# Patient Record
Sex: FEMALE | Race: BLACK OR AFRICAN AMERICAN | Employment: FULL TIME | URBAN - METROPOLITAN AREA
[De-identification: names, ages, dates, MRNs, and addresses within clinical notes are randomized per-mention and may not be internally consistent; named-entity substitution may affect disease eponyms.]

---

## 2020-05-19 PROCEDURE — 96374 THER/PROPH/DIAG INJ IV PUSH: CPT

## 2020-05-19 PROCEDURE — 99281 EMR DPT VST MAYX REQ PHY/QHP: CPT

## 2020-05-20 ENCOUNTER — HOSPITAL ENCOUNTER (EMERGENCY)
Age: 30
Discharge: HOME | End: 2020-05-20
Payer: SELF-PAY

## 2020-05-20 DIAGNOSIS — T88.7XXA NON-DOSE-RELATED ADVERSE REACTION TO MEDICATION, INITIAL ENCOUNTER: Primary | ICD-10-CM

## 2020-05-20 PROCEDURE — 80053 COMPREHEN METABOLIC PANEL: CPT

## 2020-05-20 PROCEDURE — 85025 COMPLETE CBC W/AUTO DIFF WBC: CPT

## 2020-05-20 PROCEDURE — 74011250636 HC RX REV CODE- 250/636: Performed by: EMERGENCY MEDICINE

## 2020-05-20 PROCEDURE — 83690 ASSAY OF LIPASE: CPT

## 2020-05-20 RX ORDER — ONDANSETRON 2 MG/ML
4 INJECTION INTRAMUSCULAR; INTRAVENOUS
Status: COMPLETED | OUTPATIENT
Start: 2020-05-20 | End: 2020-05-20

## 2020-05-20 RX ADMIN — SODIUM CHLORIDE 1000 ML: 900 INJECTION, SOLUTION INTRAVENOUS at 02:46

## 2020-05-20 RX ADMIN — ONDANSETRON HYDROCHLORIDE 4 MG: 2 INJECTION INTRAMUSCULAR; INTRAVENOUS at 02:46

## 2020-05-20 NOTE — ED PROVIDER NOTES
EMERGENCY DEPARTMENT HISTORY AND PHYSICAL EXAM    Date: (Not on file)  Patient Name: Vidya Milton    History of Presenting Illness     No chief complaint on file. History Provided By: Patient    Additional History (Context):   2:13 AM  Vidya Milton is a 27 y.o. female with PMHX of HTN who presents to the emergency department C/O nausea x 5/19/20. Pt has associated sxs of abd pain, vomiting, SOB and chest pain. Pt states that she was seen at [facility] yesterday afternoon and diagnosed with a kidney infection. She was discharged and prescribed Percocet which initiated her sxs of N/V and abd pain. Pt denies having these sxs prior to taking the Percocet. She also denies sxs of diarrhea, fever and chills. Pt states that she just \"wants to get back to work\". She notes allergies to strawberries, oysters and latex. Social History  Pt notes Tobacco use. No ETOH use. No illicit drug use. Family History  Renal related issues  HTN    PCP: Other, MD Jocelyne        Past History     Past Medical History:  No past medical history on file. Past Surgical History:  No past surgical history on file. Family History:  No family history on file. Social History:  Social History     Tobacco Use    Smoking status: Not on file   Substance Use Topics    Alcohol use: Not on file    Drug use: Not on file       Allergies: Allergies not on file      Review of Systems   Review of Systems   Constitutional: Negative for chills and fever. Respiratory: Positive for shortness of breath. Cardiovascular: Positive for chest pain. Gastrointestinal: Positive for abdominal pain, nausea and vomiting. Negative for diarrhea. All other systems reviewed and are negative. Physical Exam   There were no vitals filed for this visit. Physical Exam  Vitals signs and nursing note reviewed. Constitutional:       General: She is not in acute distress. Appearance: She is well-developed. She is not diaphoretic.    HENT: Head: Normocephalic and atraumatic. Right Ear: External ear normal.      Left Ear: External ear normal.      Mouth/Throat:      Pharynx: No oropharyngeal exudate. Eyes:      General: No scleral icterus. Conjunctiva/sclera: Conjunctivae normal.      Pupils: Pupils are equal, round, and reactive to light. Neck:      Musculoskeletal: Normal range of motion and neck supple. Thyroid: No thyromegaly. Vascular: No JVD. Trachea: No tracheal deviation. Cardiovascular:      Rate and Rhythm: Normal rate and regular rhythm. Heart sounds: Normal heart sounds. Pulmonary:      Effort: Pulmonary effort is normal. No respiratory distress. Breath sounds: Normal breath sounds. No stridor. Abdominal:      General: Bowel sounds are normal. There is no distension. Palpations: Abdomen is soft. Tenderness: There is no abdominal tenderness. There is no guarding or rebound. Musculoskeletal: Normal range of motion. General: No tenderness. Lymphadenopathy:      Cervical: No cervical adenopathy. Skin:     General: Skin is warm and dry. Findings: No erythema or rash. Neurological:      Mental Status: She is alert and oriented to person, place, and time. Cranial Nerves: No cranial nerve deficit. Coordination: Coordination normal.      Deep Tendon Reflexes: Reflexes are normal and symmetric. Reflexes normal.   Psychiatric:         Behavior: Behavior normal.         Thought Content: Thought content normal.         Judgment: Judgment normal.           Diagnostic Study Results     Labs -   No results found for this or any previous visit (from the past 12 hour(s)).     Radiologic Studies -   No orders to display     CT Results  (Last 48 hours)    None        CXR Results  (Last 48 hours)    None          Medications given in the ED-  Medications   ondansetron (ZOFRAN) injection 4 mg (4 mg IntraVENous Given 5/20/20 0246)   sodium chloride 0.9 % bolus infusion 1,000 mL (1,000 mL IntraVENous New Bag 5/20/20 0246)         Medical Decision Making   I am the first provider for this patient. I reviewed the vital signs, available nursing notes, past medical history, past surgical history, family history and social history. Vital Signs-Reviewed the patient's vital signs. Pulse Oximetry Analysis - 100% on RA       Records Reviewed: NURSING NOTES AND PREVIOUS MEDICAL RECORDS    Provider Notes (Medical Decision Making):   Side effects are from pain medications. Charts are unavailable for review. Pt looks well and is not sick or toxic. Will check electrolytes, treat sxs and discharge with medications to treat sxs. Procedures:  Procedures    ED Course:   4:56 AM: Initial assessment performed. The patients presenting problems have been discussed, and they are in agreement with the care plan formulated and outlined with them. I have encouraged them to ask questions as they arise throughout their visit. Diagnosis and Disposition       DISCHARGE NOTE:  2:40 AM  Gus Bowen's  results have been reviewed with her. She has been counseled regarding her diagnosis, treatment, and plan. She verbally conveys understanding and agreement of the signs, symptoms, diagnosis, treatment and prognosis and additionally agrees to follow up as discussed. She also agrees with the care-plan and conveys that all of her questions have been answered. I have also provided discharge instructions for her that include: educational information regarding their diagnosis and treatment, and list of reasons why they would want to return to the ED prior to their follow-up appointment, should her condition change. She has been provided with education for proper emergency department utilization. CLINICAL IMPRESSION:    1. Non-dose-related adverse reaction to medication, initial encounter        PLAN:  1. D/C Home  2. There are no discharge medications for this patient.     3.   Follow-up Information None       _______________________________    This note was partially transcribed via voice recognition software. Although efforts have been made to catch any discrepancies, it may contain sound alike words, grammatical errors, or nonsensical words. Attestations: This note is prepared by SANTOS , acting as Scribe for Lenny. Daina De La Garza MD .    [employer]. Daina De La Garza MD :  The scribe's documentation has been prepared under my direction and personally reviewed by me in its entirety. I confirm that the note above accurately reflects all work, treatment, procedures, and medical decision making performed by me.

## 2020-05-21 LAB
ALBUMIN SERPL-MCNC: 3.5 G/DL (ref 3.4–5)
ALBUMIN/GLOB SERPL: 0.8 {RATIO} (ref 0.8–1.7)
ALP SERPL-CCNC: 104 U/L (ref 45–117)
ALT SERPL-CCNC: 30 U/L (ref 13–56)
ANION GAP SERPL CALC-SCNC: 9 MMOL/L (ref 3–18)
AST SERPL-CCNC: 14 U/L (ref 10–38)
BASOPHILS # BLD: 0 K/UL (ref 0–0.1)
BASOPHILS NFR BLD: 0 % (ref 0–2)
BILIRUB SERPL-MCNC: 0.4 MG/DL (ref 0.2–1)
BUN SERPL-MCNC: 10 MG/DL (ref 7–18)
BUN/CREAT SERPL: 10 (ref 12–20)
CALCIUM SERPL-MCNC: 9.3 MG/DL (ref 8.5–10.1)
CHLORIDE SERPL-SCNC: 110 MMOL/L (ref 100–111)
CO2 SERPL-SCNC: 22 MMOL/L (ref 21–32)
CREAT SERPL-MCNC: 1.04 MG/DL (ref 0.6–1.3)
DIFFERENTIAL METHOD BLD: ABNORMAL
EOSINOPHIL # BLD: 0.1 K/UL (ref 0–0.4)
EOSINOPHIL NFR BLD: 1 % (ref 0–5)
ERYTHROCYTE [DISTWIDTH] IN BLOOD BY AUTOMATED COUNT: 13.5 % (ref 11.6–14.5)
GLOBULIN SER CALC-MCNC: 4.5 G/DL (ref 2–4)
GLUCOSE SERPL-MCNC: 104 MG/DL (ref 74–99)
HCT VFR BLD AUTO: 40.1 % (ref 35–45)
HGB BLD-MCNC: 13.3 G/DL (ref 12–16)
LIPASE SERPL-CCNC: 55 U/L (ref 73–393)
LYMPHOCYTES # BLD: 2.4 K/UL (ref 0.9–3.6)
LYMPHOCYTES NFR BLD: 24 % (ref 21–52)
MCH RBC QN AUTO: 26.4 PG (ref 24–34)
MCHC RBC AUTO-ENTMCNC: 33.2 G/DL (ref 31–37)
MCV RBC AUTO: 79.6 FL (ref 74–97)
MONOCYTES # BLD: 1.2 K/UL (ref 0.05–1.2)
MONOCYTES NFR BLD: 12 % (ref 3–10)
NEUTS SEG # BLD: 6.2 K/UL (ref 1.8–8)
NEUTS SEG NFR BLD: 63 % (ref 40–73)
PLATELET # BLD AUTO: 240 K/UL (ref 135–420)
PMV BLD AUTO: 12.2 FL (ref 9.2–11.8)
POTASSIUM SERPL-SCNC: 3.8 MMOL/L (ref 3.5–5.5)
PROT SERPL-MCNC: 8 G/DL (ref 6.4–8.2)
RBC # BLD AUTO: 5.04 M/UL (ref 4.2–5.3)
SODIUM SERPL-SCNC: 141 MMOL/L (ref 136–145)
WBC # BLD AUTO: 9.9 K/UL (ref 4.6–13.2)

## 2020-12-01 ENCOUNTER — HOSPITAL ENCOUNTER (EMERGENCY)
Age: 30
Discharge: HOME | End: 2020-12-01
Attending: EMERGENCY MEDICINE | Admitting: EMERGENCY MEDICINE

## 2020-12-01 VITALS
DIASTOLIC BLOOD PRESSURE: 75 MMHG | OXYGEN SATURATION: 100 % | TEMPERATURE: 97.8 F | RESPIRATION RATE: 15 BRPM | SYSTOLIC BLOOD PRESSURE: 130 MMHG | HEIGHT: 62 IN | HEART RATE: 82 BPM | WEIGHT: 189 LBS | BODY MASS INDEX: 34.78 KG/M2

## 2020-12-01 DIAGNOSIS — R51.9 ACUTE NONINTRACTABLE HEADACHE, UNSPECIFIED HEADACHE TYPE: Primary | ICD-10-CM

## 2020-12-01 PROCEDURE — 96375 TX/PRO/DX INJ NEW DRUG ADDON: CPT

## 2020-12-01 PROCEDURE — 96374 THER/PROPH/DIAG INJ IV PUSH: CPT

## 2020-12-01 PROCEDURE — 74011250637 HC RX REV CODE- 250/637: Performed by: EMERGENCY MEDICINE

## 2020-12-01 PROCEDURE — 99285 EMERGENCY DEPT VISIT HI MDM: CPT

## 2020-12-01 PROCEDURE — 74011250636 HC RX REV CODE- 250/636: Performed by: EMERGENCY MEDICINE

## 2020-12-01 RX ORDER — BUTALBITAL, ASPIRIN, AND CAFFEINE 325; 50; 40 MG/1; MG/1; MG/1
1 CAPSULE ORAL
Qty: 20 CAP | Refills: 0 | Status: SHIPPED | OUTPATIENT
Start: 2020-12-01 | End: 2021-03-01

## 2020-12-01 RX ORDER — ONDANSETRON 2 MG/ML
4 INJECTION INTRAMUSCULAR; INTRAVENOUS
Status: COMPLETED | OUTPATIENT
Start: 2020-12-01 | End: 2020-12-01

## 2020-12-01 RX ORDER — KETOROLAC TROMETHAMINE 30 MG/ML
30 INJECTION, SOLUTION INTRAMUSCULAR; INTRAVENOUS
Status: COMPLETED | OUTPATIENT
Start: 2020-12-01 | End: 2020-12-01

## 2020-12-01 RX ORDER — OLMESARTAN MEDOXOMIL 20 MG/1
25 TABLET ORAL DAILY
COMMUNITY

## 2020-12-01 RX ORDER — ACETAMINOPHEN 325 MG/1
975 TABLET ORAL
Status: COMPLETED | OUTPATIENT
Start: 2020-12-01 | End: 2020-12-01

## 2020-12-01 RX ADMIN — METHYLPREDNISOLONE SODIUM SUCCINATE 125 MG: 125 INJECTION, POWDER, FOR SOLUTION INTRAMUSCULAR; INTRAVENOUS at 05:00

## 2020-12-01 RX ADMIN — KETOROLAC TROMETHAMINE 30 MG: 30 INJECTION, SOLUTION INTRAMUSCULAR at 04:59

## 2020-12-01 RX ADMIN — SODIUM CHLORIDE 1000 ML: 900 INJECTION, SOLUTION INTRAVENOUS at 04:59

## 2020-12-01 RX ADMIN — ONDANSETRON 4 MG: 2 INJECTION INTRAMUSCULAR; INTRAVENOUS at 04:59

## 2020-12-01 RX ADMIN — ACETAMINOPHEN 975 MG: 325 TABLET ORAL at 05:00

## 2020-12-01 NOTE — LETTER
NOTIFICATION RETURN TO WORK / SCHOOL 
 
12/1/2020 5:53 AM 
 
Ms. Russ Allen [address] [id number] To Whom It May Concern: 
 
Russ Allen is currently under the care of THE [facility] EMERGENCY DEPT. She will return to work/school on: 3 December, 2020 If there are questions or concerns please have the patient contact our office.  
 
 
 
Sincerely, 
 
 
 
 
 
Shantelle Winters MD

## 2021-09-13 ENCOUNTER — HOSPITAL ENCOUNTER (EMERGENCY)
Age: 31
Discharge: HOME | End: 2021-09-13
Attending: EMERGENCY MEDICINE
Payer: MEDICAID

## 2021-09-13 VITALS
SYSTOLIC BLOOD PRESSURE: 125 MMHG | HEIGHT: 62 IN | TEMPERATURE: 97.1 F | BODY MASS INDEX: 35.88 KG/M2 | OXYGEN SATURATION: 100 % | DIASTOLIC BLOOD PRESSURE: 84 MMHG | HEART RATE: 92 BPM | WEIGHT: 195 LBS | RESPIRATION RATE: 19 BRPM

## 2021-09-13 DIAGNOSIS — R11.2 NAUSEA AND VOMITING, INTRACTABILITY OF VOMITING NOT SPECIFIED, UNSPECIFIED VOMITING TYPE: Primary | ICD-10-CM

## 2021-09-13 LAB
ALBUMIN SERPL-MCNC: 3.8 G/DL (ref 3.4–5)
ALBUMIN/GLOB SERPL: 0.8 {RATIO} (ref 0.8–1.7)
ALP SERPL-CCNC: 97 U/L (ref 45–117)
ALT SERPL-CCNC: 26 U/L (ref 13–56)
ANION GAP SERPL CALC-SCNC: 7 MMOL/L (ref 3–18)
APPEARANCE UR: ABNORMAL
AST SERPL-CCNC: 16 U/L (ref 10–38)
ATRIAL RATE: 94 BPM
BACTERIA URNS QL MICRO: ABNORMAL /HPF
BASOPHILS # BLD: 0 K/UL (ref 0–0.1)
BASOPHILS NFR BLD: 0 % (ref 0–2)
BILIRUB SERPL-MCNC: 0.3 MG/DL (ref 0.2–1)
BILIRUB UR QL: NEGATIVE
BUN SERPL-MCNC: 10 MG/DL (ref 7–18)
BUN/CREAT SERPL: 18 (ref 12–20)
CALCIUM SERPL-MCNC: 9 MG/DL (ref 8.5–10.1)
CALCULATED P AXIS, ECG09: 25 DEGREES
CALCULATED R AXIS, ECG10: 51 DEGREES
CALCULATED T AXIS, ECG11: 32 DEGREES
CHLORIDE SERPL-SCNC: 108 MMOL/L (ref 100–111)
CO2 SERPL-SCNC: 26 MMOL/L (ref 21–32)
COLOR UR: YELLOW
CREAT SERPL-MCNC: 0.57 MG/DL (ref 0.6–1.3)
DIAGNOSIS, 93000: NORMAL
DIFFERENTIAL METHOD BLD: ABNORMAL
EOSINOPHIL # BLD: 0.1 K/UL (ref 0–0.4)
EOSINOPHIL NFR BLD: 2 % (ref 0–5)
EPITH CASTS URNS QL MICRO: ABNORMAL /LPF (ref 0–5)
ERYTHROCYTE [DISTWIDTH] IN BLOOD BY AUTOMATED COUNT: 14.4 % (ref 11.6–14.5)
GLOBULIN SER CALC-MCNC: 4.5 G/DL (ref 2–4)
GLUCOSE SERPL-MCNC: 91 MG/DL (ref 74–99)
GLUCOSE UR STRIP.AUTO-MCNC: NEGATIVE MG/DL
HCG SERPL QL: NEGATIVE
HCG UR QL: NEGATIVE
HCT VFR BLD AUTO: 38.7 % (ref 35–45)
HGB BLD-MCNC: 12.3 G/DL (ref 12–16)
HGB UR QL STRIP: NEGATIVE
KETONES UR QL STRIP.AUTO: NEGATIVE MG/DL
LEUKOCYTE ESTERASE UR QL STRIP.AUTO: ABNORMAL
LIPASE SERPL-CCNC: 102 U/L (ref 73–393)
LYMPHOCYTES # BLD: 2.1 K/UL (ref 0.9–3.6)
LYMPHOCYTES NFR BLD: 38 % (ref 21–52)
MCH RBC QN AUTO: 25.8 PG (ref 24–34)
MCHC RBC AUTO-ENTMCNC: 31.8 G/DL (ref 31–37)
MCV RBC AUTO: 81.3 FL (ref 78–100)
MONOCYTES # BLD: 0.4 K/UL (ref 0.05–1.2)
MONOCYTES NFR BLD: 6 % (ref 3–10)
MUCOUS THREADS URNS QL MICRO: ABNORMAL /LPF
NEUTS SEG # BLD: 3 K/UL (ref 1.8–8)
NEUTS SEG NFR BLD: 53 % (ref 40–73)
NITRITE UR QL STRIP.AUTO: NEGATIVE
P-R INTERVAL, ECG05: 158 MS
PH UR STRIP: 7 [PH] (ref 5–8)
PLATELET # BLD AUTO: 242 K/UL (ref 135–420)
PMV BLD AUTO: 13.2 FL (ref 9.2–11.8)
POTASSIUM SERPL-SCNC: 4.4 MMOL/L (ref 3.5–5.5)
PROT SERPL-MCNC: 8.3 G/DL (ref 6.4–8.2)
PROT UR STRIP-MCNC: NEGATIVE MG/DL
Q-T INTERVAL, ECG07: 338 MS
QRS DURATION, ECG06: 86 MS
QTC CALCULATION (BEZET), ECG08: 422 MS
RBC # BLD AUTO: 4.76 M/UL (ref 4.2–5.3)
RBC #/AREA URNS HPF: ABNORMAL /HPF (ref 0–5)
SODIUM SERPL-SCNC: 141 MMOL/L (ref 136–145)
SP GR UR REFRACTOMETRY: 1.02 (ref 1–1.03)
UROBILINOGEN UR QL STRIP.AUTO: 1 EU/DL (ref 0.2–1)
VENTRICULAR RATE, ECG03: 94 BPM
WBC # BLD AUTO: 5.6 K/UL (ref 4.6–13.2)
WBC URNS QL MICRO: ABNORMAL /HPF (ref 0–5)

## 2021-09-13 PROCEDURE — 85025 COMPLETE CBC W/AUTO DIFF WBC: CPT

## 2021-09-13 PROCEDURE — 84703 CHORIONIC GONADOTROPIN ASSAY: CPT

## 2021-09-13 PROCEDURE — 96374 THER/PROPH/DIAG INJ IV PUSH: CPT

## 2021-09-13 PROCEDURE — 99284 EMERGENCY DEPT VISIT MOD MDM: CPT

## 2021-09-13 PROCEDURE — 81001 URINALYSIS AUTO W/SCOPE: CPT

## 2021-09-13 PROCEDURE — 93005 ELECTROCARDIOGRAM TRACING: CPT

## 2021-09-13 PROCEDURE — 81025 URINE PREGNANCY TEST: CPT

## 2021-09-13 PROCEDURE — 74011250636 HC RX REV CODE- 250/636: Performed by: PHYSICIAN ASSISTANT

## 2021-09-13 PROCEDURE — 96375 TX/PRO/DX INJ NEW DRUG ADDON: CPT

## 2021-09-13 PROCEDURE — 80053 COMPREHEN METABOLIC PANEL: CPT

## 2021-09-13 PROCEDURE — 83690 ASSAY OF LIPASE: CPT

## 2021-09-13 PROCEDURE — 96361 HYDRATE IV INFUSION ADD-ON: CPT

## 2021-09-13 RX ORDER — KETOROLAC TROMETHAMINE 30 MG/ML
30 INJECTION, SOLUTION INTRAMUSCULAR; INTRAVENOUS
Status: COMPLETED | OUTPATIENT
Start: 2021-09-13 | End: 2021-09-13

## 2021-09-13 RX ORDER — ONDANSETRON 4 MG/1
4 TABLET, ORALLY DISINTEGRATING ORAL
Qty: 12 TABLET | Refills: 0 | Status: SHIPPED | OUTPATIENT
Start: 2021-09-13

## 2021-09-13 RX ORDER — ONDANSETRON 2 MG/ML
4 INJECTION INTRAMUSCULAR; INTRAVENOUS
Status: COMPLETED | OUTPATIENT
Start: 2021-09-13 | End: 2021-09-13

## 2021-09-13 RX ORDER — FAMOTIDINE 10 MG/ML
20 INJECTION INTRAVENOUS
Status: COMPLETED | OUTPATIENT
Start: 2021-09-13 | End: 2021-09-13

## 2021-09-13 RX ADMIN — SODIUM CHLORIDE, SODIUM LACTATE, POTASSIUM CHLORIDE, AND CALCIUM CHLORIDE 1000 ML: 600; 310; 30; 20 INJECTION, SOLUTION INTRAVENOUS at 09:34

## 2021-09-13 RX ADMIN — FAMOTIDINE 20 MG: 10 INJECTION, SOLUTION INTRAVENOUS at 09:34

## 2021-09-13 RX ADMIN — KETOROLAC TROMETHAMINE 30 MG: 30 INJECTION, SOLUTION INTRAMUSCULAR at 11:36

## 2021-09-13 RX ADMIN — ONDANSETRON 4 MG: 2 INJECTION INTRAMUSCULAR; INTRAVENOUS at 09:34

## 2021-09-13 NOTE — ED PROVIDER NOTES
EMERGENCY DEPARTMENT HISTORY AND PHYSICAL EXAM    Date: 9/13/2021  Patient Name: Noman Tapia    History of Presenting Illness     Chief Complaint   Patient presents with    Vomiting    Syncope         History Provided By: Patient    Chief Complaint: vomiting    HPI(Context):   9:07 AM  Noman Tapia is a 32 y.o. female with PMHX of HTN who presents to the emergency department C/O vomiting. Associated sxs include nausea and near syncope. Sxs x this AM. Pt notes she awoke with nausea and low appetite. Pt vomited several times while at work. Sxs worse with PO intake. No sick contacts or known COVID exposures. Pt denies fever, cough, abdominal pain, and any other sxs or complaints. PCP: Jose, MD Jocelyne    Current Outpatient Medications   Medication Sig Dispense Refill    ondansetron (Zofran ODT) 4 mg disintegrating tablet Take 1 Tablet by mouth every eight (8) hours as needed for Nausea (or vomiting.). Allow to dissolve on tongue 12 Tablet 0    olmesartan (Benicar) 20 mg tablet Take 25 mg by mouth daily. Past History     Past Medical History:  Past Medical History:   Diagnosis Date    Hypertension        Past Surgical History:  History reviewed. No pertinent surgical history. Family History:  History reviewed. No pertinent family history. Social History:  Social History     Tobacco Use    Smoking status: Current Every Day Smoker     Packs/day: 0.25   Substance Use Topics    Alcohol use: Yes     Comment: social    Drug use: Never       Allergies:  No Known Allergies      Review of Systems   Review of Systems   Constitutional: Positive for appetite change. Negative for chills and fever. HENT: Negative for congestion. Respiratory: Negative for cough. Gastrointestinal: Positive for nausea and vomiting. Negative for abdominal pain and diarrhea. Musculoskeletal: Negative for myalgias. Allergic/Immunologic: Negative for immunocompromised state.    All other systems reviewed and are negative. Physical Exam     Vitals:    09/13/21 0901 09/13/21 1015 09/13/21 1045   BP: 137/85 130/69 125/84   Pulse: 95 93 92   Resp: 14 17 19   Temp: 97.1 °F (36.2 °C)     SpO2: 100% 100% 100%   Weight: 88.5 kg (195 lb)     Height: 5' 2\" (1.575 m)       Physical Exam  Vitals and nursing note reviewed. Constitutional:       General: She is not in acute distress. Appearance: She is well-developed. She is not diaphoretic. Comments: AA female in NAD. Alert. Appears comfortable. HENT:      Head: Normocephalic and atraumatic. Right Ear: External ear normal.      Left Ear: External ear normal.      Nose: Nose normal.   Eyes:      General: No scleral icterus. Right eye: No discharge. Left eye: No discharge. Conjunctiva/sclera: Conjunctivae normal.   Cardiovascular:      Rate and Rhythm: Normal rate and regular rhythm. Heart sounds: Normal heart sounds. No murmur heard. No friction rub. No gallop. Pulmonary:      Effort: Pulmonary effort is normal. No tachypnea, accessory muscle usage or respiratory distress. Breath sounds: Normal breath sounds. No decreased breath sounds, wheezing, rhonchi or rales. Abdominal:      Palpations: Abdomen is soft. Tenderness: There is no right CVA tenderness, left CVA tenderness, guarding or rebound. Negative signs include Collazo's sign. Musculoskeletal:         General: Normal range of motion. Cervical back: Normal range of motion. Skin:     General: Skin is warm and dry. Neurological:      Mental Status: She is alert and oriented to person, place, and time. Mental status is at baseline.    Psychiatric:         Behavior: Behavior normal.         Judgment: Judgment normal.             Diagnostic Study Results     Labs -     Recent Results (from the past 12 hour(s))   URINALYSIS W/ RFLX MICROSCOPIC    Collection Time: 09/13/21  9:10 AM   Result Value Ref Range    Color YELLOW      Appearance CLOUDY      Specific gravity 1.019 1.005 - 1.030      pH (UA) 7.0 5.0 - 8.0      Protein Negative NEG mg/dL    Glucose Negative NEG mg/dL    Ketone Negative NEG mg/dL    Bilirubin Negative NEG      Blood Negative NEG      Urobilinogen 1.0 0.2 - 1.0 EU/dL    Nitrites Negative NEG      Leukocyte Esterase SMALL (A) NEG     URINE MICROSCOPIC ONLY    Collection Time: 09/13/21  9:10 AM   Result Value Ref Range    WBC 4 to 10 0 - 5 /hpf    RBC 0 to 3 0 - 5 /hpf    Epithelial cells 3+ 0 - 5 /lpf    Bacteria 3+ (A) NEG /hpf    Mucus 2+ (A) NEG /lpf   EKG, 12 LEAD, INITIAL    Collection Time: 09/13/21  9:10 AM   Result Value Ref Range    Ventricular Rate 94 BPM    Atrial Rate 94 BPM    P-R Interval 158 ms    QRS Duration 86 ms    Q-T Interval 338 ms    QTC Calculation (Bezet) 422 ms    Calculated P Axis 25 degrees    Calculated R Axis 51 degrees    Calculated T Axis 32 degrees    Diagnosis       Normal sinus rhythm  Normal ECG  No previous ECGs available     CBC WITH AUTOMATED DIFF    Collection Time: 09/13/21  9:14 AM   Result Value Ref Range    WBC 5.6 4.6 - 13.2 K/uL    RBC 4.76 4.20 - 5.30 M/uL    HGB 12.3 12.0 - 16.0 g/dL    HCT 38.7 35.0 - 45.0 %    MCV 81.3 78.0 - 100.0 FL    MCH 25.8 24.0 - 34.0 PG    MCHC 31.8 31.0 - 37.0 g/dL    RDW 14.4 11.6 - 14.5 %    PLATELET 922 440 - 010 K/uL    MPV 13.2 (H) 9.2 - 11.8 FL    NEUTROPHILS 53 40 - 73 %    LYMPHOCYTES 38 21 - 52 %    MONOCYTES 6 3 - 10 %    EOSINOPHILS 2 0 - 5 %    BASOPHILS 0 0 - 2 %    ABS. NEUTROPHILS 3.0 1.8 - 8.0 K/UL    ABS. LYMPHOCYTES 2.1 0.9 - 3.6 K/UL    ABS. MONOCYTES 0.4 0.05 - 1.2 K/UL    ABS. EOSINOPHILS 0.1 0.0 - 0.4 K/UL    ABS.  BASOPHILS 0.0 0.0 - 0.1 K/UL    DF AUTOMATED     METABOLIC PANEL, COMPREHENSIVE    Collection Time: 09/13/21  9:14 AM   Result Value Ref Range    Sodium 141 136 - 145 mmol/L    Potassium 4.4 3.5 - 5.5 mmol/L    Chloride 108 100 - 111 mmol/L    CO2 26 21 - 32 mmol/L    Anion gap 7 3.0 - 18 mmol/L    Glucose 91 74 - 99 mg/dL    BUN 10 7.0 - 18 MG/DL Creatinine 0.57 (L) 0.6 - 1.3 MG/DL    BUN/Creatinine ratio 18 12 - 20      GFR est AA >60 >60 ml/min/1.73m2    GFR est non-AA >60 >60 ml/min/1.73m2    Calcium 9.0 8.5 - 10.1 MG/DL    Bilirubin, total 0.3 0.2 - 1.0 MG/DL    ALT (SGPT) 26 13 - 56 U/L    AST (SGOT) 16 10 - 38 U/L    Alk. phosphatase 97 45 - 117 U/L    Protein, total 8.3 (H) 6.4 - 8.2 g/dL    Albumin 3.8 3.4 - 5.0 g/dL    Globulin 4.5 (H) 2.0 - 4.0 g/dL    A-G Ratio 0.8 0.8 - 1.7     LIPASE    Collection Time: 09/13/21  9:14 AM   Result Value Ref Range    Lipase 102 73 - 393 U/L   HCG QL SERUM    Collection Time: 09/13/21  9:14 AM   Result Value Ref Range    HCG, Ql. Negative NEG     HCG URINE, QL. - POC    Collection Time: 09/13/21  9:20 AM   Result Value Ref Range    Pregnancy test,urine (POC) Negative NEG           No orders to display     CT Results  (Last 48 hours)    None        CXR Results  (Last 48 hours)    None          Medications given in the ED-  Medications   lactated ringers bolus infusion 1,000 mL (0 mL IntraVENous IV Completed 9/13/21 1127)   ondansetron (ZOFRAN) injection 4 mg (4 mg IntraVENous Given 9/13/21 0934)   famotidine (PF) (PEPCID) injection 20 mg (20 mg IntraVENous Given 9/13/21 0934)   ketorolac (TORADOL) injection 30 mg (30 mg IntraVENous Given 9/13/21 1136)         Medical Decision Making   I am the first provider for this patient. I reviewed the vital signs, available nursing notes, past medical history, past surgical history, family history and social history. Vital Signs-Reviewed the patient's vital signs. Pulse Oximetry Analysis - 100% on RA. NORMAL     Records Reviewed: Nursing Notes    Provider Notes (Medical Decision Making): viral, food borne, dehydration, metabolic derangement    Procedures:  Procedures    ED Course:   9:07 AM Initial assessment performed. The patients presenting problems have been discussed, and they are in agreement with the care plan formulated and outlined with them.   I have encouraged them to ask questions as they arise throughout their visit. Diagnosis and Disposition       10:05 AM  Offered COVID swab but refuses as she had negative COVID test 2 weeks ago and recently came back to work from [address]. Afebrile. Benign abdomen. No pain at [employer]. Neg Collazo's. Suspect viral v food borne process. [place] diet. Antiemetics. FU with PCP. Reasons to RTED discussed with pt. All questions answered. Pt feels comfortable going home at this time. Pt expressed understanding and she agrees with plan. 1. Nausea and vomiting, intractability of vomiting not specified, unspecified vomiting type        PLAN:  1. D/C Home  2. Discharge Medication List as of 9/13/2021 11:36 AM      START taking these medications    Details   ondansetron (Zofran ODT) 4 mg disintegrating tablet Take 1 Tablet by mouth every eight (8) hours as needed for Nausea (or vomiting.). Allow to dissolve on tongue, Normal, Disp-12 Tablet, R-0         CONTINUE these medications which have NOT CHANGED    Details   olmesartan (Benicar) 20 mg tablet Take 25 mg by mouth daily. , Historical Med           3. Follow-up Information     Follow up With Specialties Details Why Contact Info    your PCP        [facility] EMERGENCY DEPT Emergency Medicine   [address] [phone]        _______________________________    Attestations: This note is prepared by Angie Rivera PA-C.  _______________________________          Please note that this dictation was completed with [employer], the [employer] voice recognition software. Quite often unanticipated grammatical, syntax, homophones, and other interpretive errors are inadvertently transcribed by the computer software. Please disregard these errors. Please excuse any errors that have escaped final proofreading.

## 2021-09-13 NOTE — LETTER
[facility]  [facility]  [address] [address] [phone]    Work/School Note    Date: 9/13/2021    To Whom It May concern:    Karen Headley was seen and treated today in the emergency room by the following provider(s):  Attending Provider: Winnifred Sacks, MD  Physician Assistant: Niall Gutierrez PA-C. Karen Headley may return to work on 09/15/2021.     Sincerely,          Vilma Blanchard PA-C

## 2022-12-13 ENCOUNTER — HOSPITAL ENCOUNTER (EMERGENCY)
Age: 32
Discharge: HOME | End: 2022-12-13
Attending: EMERGENCY MEDICINE
Payer: COMMERCIAL

## 2022-12-13 ENCOUNTER — APPOINTMENT (OUTPATIENT)
Dept: CT IMAGING | Age: 32
End: 2022-12-13
Attending: EMERGENCY MEDICINE
Payer: COMMERCIAL

## 2022-12-13 VITALS
SYSTOLIC BLOOD PRESSURE: 133 MMHG | HEIGHT: 64 IN | OXYGEN SATURATION: 100 % | WEIGHT: 200 LBS | DIASTOLIC BLOOD PRESSURE: 70 MMHG | HEART RATE: 77 BPM | BODY MASS INDEX: 34.15 KG/M2 | RESPIRATION RATE: 18 BRPM | TEMPERATURE: 97.5 F

## 2022-12-13 DIAGNOSIS — N13.2 URETERAL STONE WITH HYDRONEPHROSIS: Primary | ICD-10-CM

## 2022-12-13 DIAGNOSIS — N23 RENAL COLIC ON LEFT SIDE: ICD-10-CM

## 2022-12-13 LAB
ALBUMIN SERPL-MCNC: 3.7 G/DL (ref 3.4–5)
ALBUMIN/GLOB SERPL: 0.9 {RATIO} (ref 0.8–1.7)
ALP SERPL-CCNC: 77 U/L (ref 45–117)
ALT SERPL-CCNC: 16 U/L (ref 13–56)
AMORPH CRY URNS QL MICRO: ABNORMAL
ANION GAP SERPL CALC-SCNC: 2 MMOL/L (ref 3–18)
APPEARANCE UR: ABNORMAL
AST SERPL-CCNC: 10 U/L (ref 10–38)
BACTERIA URNS QL MICRO: ABNORMAL /HPF
BASOPHILS # BLD: 0 K/UL (ref 0–0.1)
BASOPHILS NFR BLD: 1 % (ref 0–2)
BILIRUB SERPL-MCNC: 0.2 MG/DL (ref 0.2–1)
BILIRUB UR QL: NEGATIVE
BUN SERPL-MCNC: 10 MG/DL (ref 7–18)
BUN/CREAT SERPL: 11 (ref 12–20)
CALCIUM SERPL-MCNC: 9 MG/DL (ref 8.5–10.1)
CHLORIDE SERPL-SCNC: 108 MMOL/L (ref 100–111)
CO2 SERPL-SCNC: 29 MMOL/L (ref 21–32)
COLOR UR: ABNORMAL
CREAT SERPL-MCNC: 0.87 MG/DL (ref 0.6–1.3)
DIFFERENTIAL METHOD BLD: ABNORMAL
EOSINOPHIL # BLD: 0.1 K/UL (ref 0–0.4)
EOSINOPHIL NFR BLD: 1 % (ref 0–5)
EPITH CASTS URNS QL MICRO: ABNORMAL /LPF (ref 0–5)
ERYTHROCYTE [DISTWIDTH] IN BLOOD BY AUTOMATED COUNT: 14.8 % (ref 11.6–14.5)
GLOBULIN SER CALC-MCNC: 4 G/DL (ref 2–4)
GLUCOSE SERPL-MCNC: 111 MG/DL (ref 74–99)
GLUCOSE UR STRIP.AUTO-MCNC: NEGATIVE MG/DL
HCG UR QL: NEGATIVE
HCT VFR BLD AUTO: 37.2 % (ref 35–45)
HGB BLD-MCNC: 11.4 G/DL (ref 12–16)
HGB UR QL STRIP: ABNORMAL
IMM GRANULOCYTES # BLD AUTO: 0 K/UL (ref 0–0.04)
IMM GRANULOCYTES NFR BLD AUTO: 0 % (ref 0–0.5)
KETONES UR QL STRIP.AUTO: NEGATIVE MG/DL
LEUKOCYTE ESTERASE UR QL STRIP.AUTO: NEGATIVE
LIPASE SERPL-CCNC: 132 U/L (ref 73–393)
LYMPHOCYTES # BLD: 2.7 K/UL (ref 0.9–3.6)
LYMPHOCYTES NFR BLD: 47 % (ref 21–52)
MCH RBC QN AUTO: 26.1 PG (ref 24–34)
MCHC RBC AUTO-ENTMCNC: 30.6 G/DL (ref 31–37)
MCV RBC AUTO: 85.3 FL (ref 78–100)
MONOCYTES # BLD: 0.4 K/UL (ref 0.05–1.2)
MONOCYTES NFR BLD: 8 % (ref 3–10)
NEUTS SEG # BLD: 2.4 K/UL (ref 1.8–8)
NEUTS SEG NFR BLD: 43 % (ref 40–73)
NITRITE UR QL STRIP.AUTO: NEGATIVE
NRBC # BLD: 0 K/UL (ref 0–0.01)
NRBC BLD-RTO: 0 PER 100 WBC
PH UR STRIP: 8.5 [PH] (ref 5–8)
PLATELET # BLD AUTO: 235 K/UL (ref 135–420)
PMV BLD AUTO: 12.7 FL (ref 9.2–11.8)
POTASSIUM SERPL-SCNC: 3.9 MMOL/L (ref 3.5–5.5)
PROT SERPL-MCNC: 7.7 G/DL (ref 6.4–8.2)
PROT UR STRIP-MCNC: 100 MG/DL
RBC # BLD AUTO: 4.36 M/UL (ref 4.2–5.3)
RBC #/AREA URNS HPF: ABNORMAL /HPF (ref 0–5)
SODIUM SERPL-SCNC: 139 MMOL/L (ref 136–145)
SP GR UR REFRACTOMETRY: 1.02 (ref 1–1.03)
UROBILINOGEN UR QL STRIP.AUTO: 0.2 EU/DL (ref 0.2–1)
WBC # BLD AUTO: 5.6 K/UL (ref 4.6–13.2)
WBC URNS QL MICRO: ABNORMAL /HPF (ref 0–5)

## 2022-12-13 PROCEDURE — 85025 COMPLETE CBC W/AUTO DIFF WBC: CPT

## 2022-12-13 PROCEDURE — 74011250637 HC RX REV CODE- 250/637: Performed by: EMERGENCY MEDICINE

## 2022-12-13 PROCEDURE — 96375 TX/PRO/DX INJ NEW DRUG ADDON: CPT

## 2022-12-13 PROCEDURE — 83690 ASSAY OF LIPASE: CPT

## 2022-12-13 PROCEDURE — 99284 EMERGENCY DEPT VISIT MOD MDM: CPT

## 2022-12-13 PROCEDURE — 74176 CT ABD & PELVIS W/O CONTRAST: CPT

## 2022-12-13 PROCEDURE — 81001 URINALYSIS AUTO W/SCOPE: CPT

## 2022-12-13 PROCEDURE — 96376 TX/PRO/DX INJ SAME DRUG ADON: CPT

## 2022-12-13 PROCEDURE — 96374 THER/PROPH/DIAG INJ IV PUSH: CPT

## 2022-12-13 PROCEDURE — 81025 URINE PREGNANCY TEST: CPT

## 2022-12-13 PROCEDURE — 74011250636 HC RX REV CODE- 250/636: Performed by: EMERGENCY MEDICINE

## 2022-12-13 PROCEDURE — 80053 COMPREHEN METABOLIC PANEL: CPT

## 2022-12-13 RX ORDER — OXYCODONE HYDROCHLORIDE 5 MG/1
5 TABLET ORAL
Qty: 9 TABLET | Refills: 0 | Status: SHIPPED | OUTPATIENT
Start: 2022-12-13 | End: 2022-12-16

## 2022-12-13 RX ORDER — KETOROLAC TROMETHAMINE 10 MG/1
10 TABLET, FILM COATED ORAL
Qty: 20 TABLET | Refills: 0 | Status: SHIPPED | OUTPATIENT
Start: 2022-12-13

## 2022-12-13 RX ORDER — MORPHINE SULFATE 4 MG/ML
4 INJECTION INTRAVENOUS
Status: COMPLETED | OUTPATIENT
Start: 2022-12-13 | End: 2022-12-13

## 2022-12-13 RX ORDER — ONDANSETRON 2 MG/ML
4 INJECTION INTRAMUSCULAR; INTRAVENOUS
Status: COMPLETED | OUTPATIENT
Start: 2022-12-13 | End: 2022-12-13

## 2022-12-13 RX ORDER — KETOROLAC TROMETHAMINE 30 MG/ML
15 INJECTION, SOLUTION INTRAMUSCULAR; INTRAVENOUS
Status: COMPLETED | OUTPATIENT
Start: 2022-12-13 | End: 2022-12-13

## 2022-12-13 RX ORDER — ONDANSETRON 4 MG/1
4 TABLET, ORALLY DISINTEGRATING ORAL
Qty: 20 TABLET | Refills: 0 | Status: SHIPPED | OUTPATIENT
Start: 2022-12-13

## 2022-12-13 RX ORDER — TAMSULOSIN HYDROCHLORIDE 0.4 MG/1
0.4 CAPSULE ORAL
Status: COMPLETED | OUTPATIENT
Start: 2022-12-13 | End: 2022-12-13

## 2022-12-13 RX ORDER — MORPHINE SULFATE 4 MG/ML
4 INJECTION INTRAVENOUS
Status: DISCONTINUED | OUTPATIENT
Start: 2022-12-13 | End: 2022-12-13 | Stop reason: HOSPADM

## 2022-12-13 RX ORDER — TAMSULOSIN HYDROCHLORIDE 0.4 MG/1
0.4 CAPSULE ORAL DAILY
Qty: 5 CAPSULE | Refills: 0 | Status: SHIPPED | OUTPATIENT
Start: 2022-12-13 | End: 2022-12-18

## 2022-12-13 RX ADMIN — MORPHINE SULFATE 4 MG: 4 INJECTION INTRAVENOUS at 10:43

## 2022-12-13 RX ADMIN — KETOROLAC TROMETHAMINE 15 MG: 30 INJECTION, SOLUTION INTRAMUSCULAR; INTRAVENOUS at 12:49

## 2022-12-13 RX ADMIN — KETOROLAC TROMETHAMINE 15 MG: 30 INJECTION, SOLUTION INTRAMUSCULAR; INTRAVENOUS at 11:38

## 2022-12-13 RX ADMIN — TAMSULOSIN HYDROCHLORIDE 0.4 MG: 0.4 CAPSULE ORAL at 12:49

## 2022-12-13 RX ADMIN — SODIUM CHLORIDE 1000 ML: 9 INJECTION, SOLUTION INTRAVENOUS at 10:44

## 2022-12-13 RX ADMIN — ONDANSETRON 4 MG: 2 INJECTION INTRAMUSCULAR; INTRAVENOUS at 12:58

## 2022-12-13 RX ADMIN — ONDANSETRON 4 MG: 2 INJECTION INTRAMUSCULAR; INTRAVENOUS at 10:39

## 2022-12-13 NOTE — ED PROVIDER NOTES
EMERGENCY DEPARTMENT HISTORY AND PHYSICAL EXAM      Date: 12/13/2022  Patient Name: Valerie Keita    History of Presenting Illness     Chief Complaint   Patient presents with    Flank Pain       History Provided By: Patient    HPI: Valerie Keita, 28 y.o. female with history of ectopic pregnancy 1 year ago presents to the ED with cc of left flank pain, nausea, vomiting, vaginal spotting. Symptoms onset about 1 hour prior to arrival while patient was working across the [place]. She developed sudden onset left flank pain which is described as severe, constant, nonradiating. Symptoms associated with nonbloody nonbilious emesis as well as vaginal spotting. LMP 1 week ago and was normal for her. Denies urinary symptoms, fevers, chills, chest pain, shortness of breath. Symptoms unfamiliar to her. No prior history of kidney stones. There are no other complaints, changes, or physical findings at this time. PCP: Other, MD Jocelyne    No current facility-administered medications on file prior to encounter. Current Outpatient Medications on File Prior to Encounter   Medication Sig Dispense Refill    olmesartan (BENICAR) 20 mg tablet Take 25 mg by mouth daily. [DISCONTINUED] ondansetron (Zofran ODT) 4 mg disintegrating tablet Take 1 Tablet by mouth every eight (8) hours as needed for Nausea (or vomiting.). Allow to dissolve on tongue 12 Tablet 0       Past History     Past Medical History:  Past Medical History:   Diagnosis Date    Ectopic pregnancy     Hypertension        Past Surgical History:  History reviewed. No pertinent surgical history. Family History:  History reviewed. No pertinent family history.     Social History:  Social History     Tobacco Use    Smoking status: Every Day     Packs/day: 0.25     Types: Cigarettes   Substance Use Topics    Alcohol use: Yes     Comment: social    Drug use: Never       Allergies:  No Known Allergies      Review of Systems   Review of Systems Constitutional:  Negative for chills and fever. Respiratory:  Negative for cough and shortness of breath. Cardiovascular:  Negative for chest pain and leg swelling. Gastrointestinal:  Positive for nausea and vomiting. Negative for abdominal pain. Genitourinary:  Positive for flank pain and vaginal bleeding. Negative for dysuria, hematuria and pelvic pain. Musculoskeletal:  Negative for back pain and gait problem. Skin:  Negative for color change and rash. Neurological:  Negative for dizziness, weakness, light-headedness and headaches. All other systems reviewed and are negative. Physical Exam   Physical Exam  Vitals and nursing note reviewed. Constitutional:       General: She is not in acute distress. Appearance: Normal appearance. She is not ill-appearing or toxic-appearing. HENT:      Head: Normocephalic and atraumatic. Nose: Nose normal.      Mouth/Throat:      Mouth: Mucous membranes are moist.   Eyes:      Extraocular Movements: Extraocular movements intact. Pupils: Pupils are equal, round, and reactive to light. Cardiovascular:      Rate and Rhythm: Normal rate and regular rhythm. Heart sounds: No murmur heard. Pulmonary:      Effort: Pulmonary effort is normal. No respiratory distress. Breath sounds: Normal breath sounds. No wheezing. Abdominal:      General: There is no distension. Palpations: Abdomen is soft. Tenderness: There is abdominal tenderness (LUQ). There is left CVA tenderness. There is no right CVA tenderness, guarding or rebound. Musculoskeletal:         General: No swelling or tenderness. Normal range of motion. Cervical back: Normal range of motion and neck supple. Right lower leg: No edema. Left lower leg: No edema. Skin:     General: Skin is warm and dry. Coloration: Skin is not pale. Findings: No erythema. Neurological:      General: No focal deficit present.       Mental Status: She is alert and oriented to person, place, and time. Diagnostic Study Results     Labs -     Recent Results (from the past 12 hour(s))   CBC WITH AUTOMATED DIFF    Collection Time: 12/13/22 10:30 AM   Result Value Ref Range    WBC 5.6 4.6 - 13.2 K/uL    RBC 4.36 4.20 - 5.30 M/uL    HGB 11.4 (L) 12.0 - 16.0 g/dL    HCT 37.2 35.0 - 45.0 %    MCV 85.3 78.0 - 100.0 FL    MCH 26.1 24.0 - 34.0 PG    MCHC 30.6 (L) 31.0 - 37.0 g/dL    RDW 14.8 (H) 11.6 - 14.5 %    PLATELET 714 903 - 305 K/uL    MPV 12.7 (H) 9.2 - 11.8 FL    NRBC 0.0 0  WBC    ABSOLUTE NRBC 0.00 0.00 - 0.01 K/uL    NEUTROPHILS 43 40 - 73 %    LYMPHOCYTES 47 21 - 52 %    MONOCYTES 8 3 - 10 %    EOSINOPHILS 1 0 - 5 %    BASOPHILS 1 0 - 2 %    IMMATURE GRANULOCYTES 0 0.0 - 0.5 %    ABS. NEUTROPHILS 2.4 1.8 - 8.0 K/UL    ABS. LYMPHOCYTES 2.7 0.9 - 3.6 K/UL    ABS. MONOCYTES 0.4 0.05 - 1.2 K/UL    ABS. EOSINOPHILS 0.1 0.0 - 0.4 K/UL    ABS. BASOPHILS 0.0 0.0 - 0.1 K/UL    ABS. IMM. GRANS. 0.0 0.00 - 0.04 K/UL    DF AUTOMATED     METABOLIC PANEL, COMPREHENSIVE    Collection Time: 12/13/22 10:30 AM   Result Value Ref Range    Sodium 139 136 - 145 mmol/L    Potassium 3.9 3.5 - 5.5 mmol/L    Chloride 108 100 - 111 mmol/L    CO2 29 21 - 32 mmol/L    Anion gap 2 (L) 3.0 - 18 mmol/L    Glucose 111 (H) 74 - 99 mg/dL    BUN 10 7.0 - 18 MG/DL    Creatinine 0.87 0.6 - 1.3 MG/DL    BUN/Creatinine ratio 11 (L) 12 - 20      eGFR >60 >60 ml/min/1.73m2    Calcium 9.0 8.5 - 10.1 MG/DL    Bilirubin, total 0.2 0.2 - 1.0 MG/DL    ALT (SGPT) 16 13 - 56 U/L    AST (SGOT) 10 10 - 38 U/L    Alk.  phosphatase 77 45 - 117 U/L    Protein, total 7.7 6.4 - 8.2 g/dL    Albumin 3.7 3.4 - 5.0 g/dL    Globulin 4.0 2.0 - 4.0 g/dL    A-G Ratio 0.9 0.8 - 1.7     LIPASE    Collection Time: 12/13/22 10:30 AM   Result Value Ref Range    Lipase 132 73 - 393 U/L   URINALYSIS W/ RFLX MICROSCOPIC    Collection Time: 12/13/22 10:36 AM   Result Value Ref Range    Color RED      Appearance BLOODY      Specific gravity 1.020 1.003 - 1.030      pH (UA) 8.5 (H) 5.0 - 8.0      Protein 100 (A) NEG mg/dL    Glucose Negative NEG mg/dL    Ketone Negative NEG mg/dL    Bilirubin Negative NEG      Blood LARGE (A) NEG      Urobilinogen 0.2 0.2 - 1.0 EU/dL    Nitrites Negative NEG      Leukocyte Esterase Negative NEG     HCG URINE, QL    Collection Time: 12/13/22 10:36 AM   Result Value Ref Range    HCG urine, QL Negative NEG     URINE MICROSCOPIC ONLY    Collection Time: 12/13/22 10:36 AM   Result Value Ref Range    WBC 0 to 3 0 - 5 /hpf    RBC TOO NUMEROUS TO COUNT 0 - 5 /hpf    Epithelial cells 2+ 0 - 5 /lpf    Bacteria 1+ (A) NEG /hpf    Amorphous Crystals 1+ (A) NEG       Radiologic Studies -   CT ABD PELV WO CONT   Final Result      1. Mild left-sided hydroureteronephrosis related to a punctate proximal left   ureteric stone. 2. Small bilateral pleural effusions with mild bibasilar atelectasis. CT Results  (Last 48 hours)                 12/13/22 1151  CT ABD PELV WO CONT Final result    Impression:      1. Mild left-sided hydroureteronephrosis related to a punctate proximal left   ureteric stone. 2. Small bilateral pleural effusions with mild bibasilar atelectasis. Narrative:  EXAM: CT of the abdomen and pelvis       INDICATION: Left-sided flank pain       COMPARISON: None. TECHNIQUE: Axial CT imaging of the abdomen and pelvis was performed without   intravenous contrast. Multiplanar reformats were generated. One or more dose reduction techniques were used on this CT: automated exposure   control, adjustment of the mAs and/or kVp according to patient size, and   iterative reconstruction techniques. The specific techniques used on this CT   exam have been documented in the patient's electronic medical record.   Digital   Imaging and Communications in Medicine (DICOM) format image data are available   to nonaffiliated external healthcare facilities or entities on a secure, media free, reciprocally searchable basis with patient authorization for at least a   12-month period after this study. _______________       FINDINGS:       LOWER CHEST: Trace bilateral pleural effusions. Cardiac size normal. Minor   dependent changes of atelectasis. LIVER, BILIARY: Unenhanced appearance of the liver demonstrates no suspicious   abnormality. Several hypodensities are present which are either too small   accurately characterize are in keeping with simple cysts. No biliary dilation. Gallbladder is unremarkable. PANCREAS: Normal.       SPLEEN: Normal.       ADRENALS: Normal.       KIDNEYS/URETERS/BLADDER:      > Mild left-sided hydroureteronephrosis is present with a punctate proximal   left ureteric stone (image 58)      > Additional punctate nonobstructing lower pole left and upper pole right   renal stones are present.      > Urinary bladder is normal in configuration and appearance. PELVIC ORGANS: Unremarkable. VASCULATURE: Unremarkable       LYMPH NODES: No enlarged lymph nodes. GASTROINTESTINAL TRACT: No bowel dilation or wall thickening. No bowel   obstruction. No free intraperitoneal gas. Normal appendix. BONES: No acute or aggressive osseous abnormalities identified. OTHER: None.       _______________                 CXR Results  (Last 48 hours)      None            Medical Decision Making   I am the first provider for this patient. I reviewed the vital signs, available nursing notes, past medical history, past surgical history, family history and social history. Vital Signs-Reviewed the patient's vital signs. Patient Vitals for the past 12 hrs:   Temp Pulse Resp BP SpO2   12/13/22 1051 -- -- -- 133/70 100 %   12/13/22 0937 97.5 °F (36.4 °C) 77 18 (!) 145/77 100 %       Records Reviewed: Nursing Notes    Provider Notes (Medical Decision Making): With nausea, vomiting, and vaginal spotting. She appears uncomfortable due to symptoms.   She is afebrile and vital signs are stable. Differential diagnosis includes stone, renal colic, ureteral stone, UTI, complicated UTI with pyelonephritis, ovarian cyst, ovarian torsion, ectopic pregnancy, splenic infarct. Plan to evaluate with blood work, CT imaging, urinalysis, pregnancy test, treat symptomatically and reassess. CT demonstrating punctate ureteral stone with hydronephrosis. No sign of infection, renal function intact. Patient feeling better on reexamination. I will treat with outpatient prescription of Toradol, Zofran, Flomax, as needed oxycodone, encouraged close follow-up with urology and provided referral.  Patient given strict return to ED precautions and all questions answered. Medications Administered       ondansetron (ZOFRAN) injection 4 mg       Admin Date  12/13/2022 Action  Given Dose  4 mg Route  IntraVENous Administered By  Evangelina Tam RN                    ED Course:   Initial assessment performed. The patients presenting problems have been discussed, and they are in agreement with the care plan formulated and outlined with them. I have encouraged them to ask questions as they arise throughout their visit. Discharge Note:  The patient has been re-evaluated and is ready for discharge. Reviewed available results with patient. Counseled patient on diagnosis and care plan. Patient has expressed understanding, and all questions have been answered. Patient agrees with plan and agrees to follow up as recommended, or to return to the ED if their symptoms worsen. Discharge instructions have been provided and explained to the patient, along with reasons to return to ED      Disposition:  discharge      DISCHARGE PLAN:  1.    Discharge Medication List as of 12/13/2022 12:43 PM        START taking these medications    Details   ketorolac (TORADOL) 10 mg tablet Take 1 Tablet by mouth every six (6) hours as needed for Pain., Normal, Disp-20 Tablet, R-0      ondansetron (ZOFRAN ODT) 4 mg disintegrating tablet Take 1 Tablet by mouth every eight (8) hours as needed for Nausea or Vomiting., Normal, Disp-20 Tablet, R-0      tamsulosin (Flomax) 0.4 mg capsule Take 1 Capsule by mouth daily for 5 days. , Normal, Disp-5 Capsule, R-0      oxyCODONE IR (Roxicodone) 5 mg immediate release tablet Take 1 Tablet by mouth every six (6) hours as needed for Pain for up to 3 days. Max Daily Amount: 20 mg., Normal, Disp-9 Tablet, R-0           CONTINUE these medications which have NOT CHANGED    Details   olmesartan (BENICAR) 20 mg tablet Take 25 mg by mouth daily. , Historical Med           2. Follow-up Information       Follow up With Specialties Details Why Ruby Myers MD Urology Schedule an appointment as soon as possible for a visit   [address] [place], [place] [id number] [phone]      [facility] EMERGENCY DEPT Emergency Medicine Go to  As needed, If symptoms worsen [address] [address] [phone]          3. Return to ED if worse     Diagnosis     Clinical Impression:   1. Ureteral stone with hydronephrosis    2. Renal colic on left side        Attestations:  I am the first and primary provider of record for this patient's ED encounter. I personally performed the services described above in this documentation. Niyah Webb MD    Please note that this dictation was completed with [employer], the computer voice recognition software. Quite often unanticipated grammatical, syntax, homophones, and other interpretive errors are inadvertently transcribed by the computer software. Please disregard these errors. Please excuse any errors that have escaped final proofreading. Thank you.

## 2022-12-13 NOTE — DISCHARGE INSTRUCTIONS
You were evaluated in the emergency department for left flank pain with nausea and vomiting. Your examination was reassuring as was your work-up including blood work, urinalysis, and CT scan which shows a left-sided kidney stone explaining your symptoms. It will be important for you to follow-up with your primary care physician in 2-3 days, you can also follow-up with a urologist, Dr. Salinas Albrecht. If you develop worsening symptoms such as fevers, worsening pain, or inability eat or drink, please return to the emergency department immediately.

## 2022-12-13 NOTE — ED TRIAGE NOTES
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 \"

## 2023-03-21 ENCOUNTER — HOSPITAL ENCOUNTER (OUTPATIENT)
Facility: HOSPITAL | Age: 33
Discharge: HOME | End: 2023-03-23
Payer: COMMERCIAL

## 2023-03-21 ENCOUNTER — ANESTHESIA EVENT (OUTPATIENT)
Facility: HOSPITAL | Age: 33
End: 2023-03-21
Payer: COMMERCIAL

## 2023-03-21 ENCOUNTER — HOSPITAL ENCOUNTER (OUTPATIENT)
Facility: HOSPITAL | Age: 33
Discharge: HOME | End: 2023-03-24
Payer: COMMERCIAL

## 2023-03-21 DIAGNOSIS — K76.0 FATTY METAMORPHOSIS OF LIVER: ICD-10-CM

## 2023-03-21 DIAGNOSIS — K82.9 DISEASE OF GALLBLADDER: ICD-10-CM

## 2023-03-21 LAB
ANION GAP SERPL CALC-SCNC: 3 MMOL/L (ref 3–18)
BASOPHILS # BLD: 0 K/UL (ref 0–0.1)
BASOPHILS NFR BLD: 1 % (ref 0–2)
BUN SERPL-MCNC: 8 MG/DL (ref 7–18)
BUN/CREAT SERPL: 11 (ref 12–20)
CALCIUM SERPL-MCNC: 8.7 MG/DL (ref 8.5–10.1)
CHLORIDE SERPL-SCNC: 109 MMOL/L (ref 100–111)
CO2 SERPL-SCNC: 28 MMOL/L (ref 21–32)
CREAT SERPL-MCNC: 0.74 MG/DL (ref 0.6–1.3)
DIFFERENTIAL METHOD BLD: ABNORMAL
EKG ATRIAL RATE: 76 BPM
EKG DIAGNOSIS: NORMAL
EKG P AXIS: 54 DEGREES
EKG P-R INTERVAL: 156 MS
EKG Q-T INTERVAL: 372 MS
EKG QRS DURATION: 76 MS
EKG QTC CALCULATION (BAZETT): 418 MS
EKG R AXIS: 78 DEGREES
EKG T AXIS: 69 DEGREES
EKG VENTRICULAR RATE: 76 BPM
EOSINOPHIL # BLD: 0.1 K/UL (ref 0–0.4)
EOSINOPHIL NFR BLD: 2 % (ref 0–5)
ERYTHROCYTE [DISTWIDTH] IN BLOOD BY AUTOMATED COUNT: 14.6 % (ref 11.6–14.5)
GLUCOSE SERPL-MCNC: 100 MG/DL (ref 74–99)
HCT VFR BLD AUTO: 37.7 % (ref 35–45)
HGB BLD-MCNC: 11.9 G/DL (ref 12–16)
IMM GRANULOCYTES # BLD AUTO: 0 K/UL (ref 0–0.04)
IMM GRANULOCYTES NFR BLD AUTO: 0 % (ref 0–0.5)
LYMPHOCYTES # BLD: 2.6 K/UL (ref 0.9–3.6)
LYMPHOCYTES NFR BLD: 46 % (ref 21–52)
MCH RBC QN AUTO: 26.5 PG (ref 24–34)
MCHC RBC AUTO-ENTMCNC: 31.6 G/DL (ref 31–37)
MCV RBC AUTO: 84 FL (ref 78–100)
MONOCYTES # BLD: 0.4 K/UL (ref 0.05–1.2)
MONOCYTES NFR BLD: 8 % (ref 3–10)
NEUTS SEG # BLD: 2.4 K/UL (ref 1.8–8)
NEUTS SEG NFR BLD: 44 % (ref 40–73)
NRBC # BLD: 0 K/UL (ref 0–0.01)
NRBC BLD-RTO: 0 PER 100 WBC
PLATELET # BLD AUTO: 258 K/UL (ref 135–420)
PMV BLD AUTO: 12.3 FL (ref 9.2–11.8)
POTASSIUM SERPL-SCNC: 3.9 MMOL/L (ref 3.5–5.5)
RBC # BLD AUTO: 4.49 M/UL (ref 4.2–5.3)
SODIUM SERPL-SCNC: 140 MMOL/L (ref 136–145)
WBC # BLD AUTO: 5.6 K/UL (ref 4.6–13.2)

## 2023-03-21 PROCEDURE — 93005 ELECTROCARDIOGRAM TRACING: CPT

## 2023-03-21 PROCEDURE — 36415 COLL VENOUS BLD VENIPUNCTURE: CPT

## 2023-03-21 PROCEDURE — 85025 COMPLETE CBC W/AUTO DIFF WBC: CPT

## 2023-03-21 PROCEDURE — 80048 BASIC METABOLIC PNL TOTAL CA: CPT

## 2023-03-22 ENCOUNTER — HOSPITAL ENCOUNTER (OUTPATIENT)
Facility: HOSPITAL | Age: 33
Setting detail: OUTPATIENT SURGERY
Discharge: HOME | End: 2023-03-22
Attending: SURGERY | Admitting: SURGERY
Payer: COMMERCIAL

## 2023-03-22 ENCOUNTER — ANESTHESIA (OUTPATIENT)
Facility: HOSPITAL | Age: 33
End: 2023-03-22
Payer: COMMERCIAL

## 2023-03-22 VITALS
RESPIRATION RATE: 15 BRPM | TEMPERATURE: 97.2 F | DIASTOLIC BLOOD PRESSURE: 85 MMHG | WEIGHT: 206.5 LBS | HEART RATE: 88 BPM | HEIGHT: 64 IN | OXYGEN SATURATION: 100 % | BODY MASS INDEX: 35.26 KG/M2 | SYSTOLIC BLOOD PRESSURE: 141 MMHG

## 2023-03-22 DIAGNOSIS — K80.20 CALCULUS OF GALLBLADDER WITHOUT CHOLECYSTITIS WITHOUT OBSTRUCTION: Primary | ICD-10-CM

## 2023-03-22 LAB — HCG UR QL: NEGATIVE

## 2023-03-22 PROCEDURE — 2500000003 HC RX 250 WO HCPCS: Performed by: NURSE ANESTHETIST, CERTIFIED REGISTERED

## 2023-03-22 PROCEDURE — 2580000003 HC RX 258: Performed by: SURGERY

## 2023-03-22 PROCEDURE — 3700000001 HC ADD 15 MINUTES (ANESTHESIA): Performed by: SURGERY

## 2023-03-22 PROCEDURE — 6360000002 HC RX W HCPCS: Performed by: NURSE ANESTHETIST, CERTIFIED REGISTERED

## 2023-03-22 PROCEDURE — 2709999900 HC NON-CHARGEABLE SUPPLY: Performed by: SURGERY

## 2023-03-22 PROCEDURE — 2500000003 HC RX 250 WO HCPCS: Performed by: SURGERY

## 2023-03-22 PROCEDURE — 3600000012 HC SURGERY LEVEL 2 ADDTL 15MIN: Performed by: SURGERY

## 2023-03-22 PROCEDURE — 3700000000 HC ANESTHESIA ATTENDED CARE: Performed by: SURGERY

## 2023-03-22 PROCEDURE — 81025 URINE PREGNANCY TEST: CPT

## 2023-03-22 PROCEDURE — 6360000002 HC RX W HCPCS: Performed by: SURGERY

## 2023-03-22 PROCEDURE — 88313 SPECIAL STAINS GROUP 2: CPT

## 2023-03-22 PROCEDURE — 7100000000 HC PACU RECOVERY - FIRST 15 MIN: Performed by: SURGERY

## 2023-03-22 PROCEDURE — 7100000010 HC PHASE II RECOVERY - FIRST 15 MIN: Performed by: SURGERY

## 2023-03-22 PROCEDURE — 88304 TISSUE EXAM BY PATHOLOGIST: CPT

## 2023-03-22 PROCEDURE — 7100000001 HC PACU RECOVERY - ADDTL 15 MIN: Performed by: SURGERY

## 2023-03-22 PROCEDURE — 6360000002 HC RX W HCPCS: Performed by: ANESTHESIOLOGY

## 2023-03-22 PROCEDURE — 7100000011 HC PHASE II RECOVERY - ADDTL 15 MIN: Performed by: SURGERY

## 2023-03-22 PROCEDURE — 3600000002 HC SURGERY LEVEL 2 BASE: Performed by: SURGERY

## 2023-03-22 PROCEDURE — 88307 TISSUE EXAM BY PATHOLOGIST: CPT

## 2023-03-22 RX ORDER — FENTANYL CITRATE 50 UG/ML
INJECTION, SOLUTION INTRAMUSCULAR; INTRAVENOUS PRN
Status: DISCONTINUED | OUTPATIENT
Start: 2023-03-22 | End: 2023-03-22 | Stop reason: SDUPTHER

## 2023-03-22 RX ORDER — SODIUM CHLORIDE 0.9 % (FLUSH) 0.9 %
5-40 SYRINGE (ML) INJECTION EVERY 12 HOURS SCHEDULED
Status: DISCONTINUED | OUTPATIENT
Start: 2023-03-22 | End: 2023-03-22 | Stop reason: HOSPADM

## 2023-03-22 RX ORDER — FENTANYL CITRATE 50 UG/ML
25 INJECTION, SOLUTION INTRAMUSCULAR; INTRAVENOUS EVERY 5 MIN PRN
Status: DISCONTINUED | OUTPATIENT
Start: 2023-03-22 | End: 2023-03-22 | Stop reason: HOSPADM

## 2023-03-22 RX ORDER — BUPIVACAINE HYDROCHLORIDE 2.5 MG/ML
INJECTION, SOLUTION EPIDURAL; INFILTRATION; INTRACAUDAL PRN
Status: DISCONTINUED | OUTPATIENT
Start: 2023-03-22 | End: 2023-03-22 | Stop reason: ALTCHOICE

## 2023-03-22 RX ORDER — KETOROLAC TROMETHAMINE 30 MG/ML
INJECTION, SOLUTION INTRAMUSCULAR; INTRAVENOUS PRN
Status: DISCONTINUED | OUTPATIENT
Start: 2023-03-22 | End: 2023-03-22 | Stop reason: SDUPTHER

## 2023-03-22 RX ORDER — METOCLOPRAMIDE HYDROCHLORIDE 5 MG/ML
INJECTION INTRAMUSCULAR; INTRAVENOUS PRN
Status: DISCONTINUED | OUTPATIENT
Start: 2023-03-22 | End: 2023-03-22 | Stop reason: SDUPTHER

## 2023-03-22 RX ORDER — ONDANSETRON 2 MG/ML
INJECTION INTRAMUSCULAR; INTRAVENOUS PRN
Status: DISCONTINUED | OUTPATIENT
Start: 2023-03-22 | End: 2023-03-22 | Stop reason: SDUPTHER

## 2023-03-22 RX ORDER — GLYCOPYRROLATE 0.2 MG/ML
INJECTION INTRAMUSCULAR; INTRAVENOUS PRN
Status: DISCONTINUED | OUTPATIENT
Start: 2023-03-22 | End: 2023-03-22 | Stop reason: SDUPTHER

## 2023-03-22 RX ORDER — HYDROMORPHONE HYDROCHLORIDE 1 MG/ML
0.5 INJECTION, SOLUTION INTRAMUSCULAR; INTRAVENOUS; SUBCUTANEOUS EVERY 5 MIN PRN
Status: DISCONTINUED | OUTPATIENT
Start: 2023-03-22 | End: 2023-03-22 | Stop reason: HOSPADM

## 2023-03-22 RX ORDER — OXYCODONE HYDROCHLORIDE 5 MG/1
5 TABLET ORAL ONCE
Status: DISCONTINUED | OUTPATIENT
Start: 2023-03-22 | End: 2023-03-22 | Stop reason: HOSPADM

## 2023-03-22 RX ORDER — MIDAZOLAM HYDROCHLORIDE 1 MG/ML
INJECTION INTRAMUSCULAR; INTRAVENOUS PRN
Status: DISCONTINUED | OUTPATIENT
Start: 2023-03-22 | End: 2023-03-22 | Stop reason: SDUPTHER

## 2023-03-22 RX ORDER — LIDOCAINE HYDROCHLORIDE 20 MG/ML
INJECTION, SOLUTION EPIDURAL; INFILTRATION; INTRACAUDAL; PERINEURAL PRN
Status: DISCONTINUED | OUTPATIENT
Start: 2023-03-22 | End: 2023-03-22 | Stop reason: SDUPTHER

## 2023-03-22 RX ORDER — OXYCODONE HYDROCHLORIDE AND ACETAMINOPHEN 5; 325 MG/1; MG/1
1 TABLET ORAL EVERY 6 HOURS PRN
Qty: 10 TABLET | Refills: 0 | Status: SHIPPED | OUTPATIENT
Start: 2023-03-22 | End: 2023-03-27

## 2023-03-22 RX ORDER — DEXAMETHASONE SODIUM PHOSPHATE 4 MG/ML
INJECTION, SOLUTION INTRA-ARTICULAR; INTRALESIONAL; INTRAMUSCULAR; INTRAVENOUS; SOFT TISSUE PRN
Status: DISCONTINUED | OUTPATIENT
Start: 2023-03-22 | End: 2023-03-22 | Stop reason: SDUPTHER

## 2023-03-22 RX ORDER — SODIUM CHLORIDE, SODIUM LACTATE, POTASSIUM CHLORIDE, CALCIUM CHLORIDE 600; 310; 30; 20 MG/100ML; MG/100ML; MG/100ML; MG/100ML
INJECTION, SOLUTION INTRAVENOUS CONTINUOUS
Status: DISCONTINUED | OUTPATIENT
Start: 2023-03-22 | End: 2023-03-22 | Stop reason: HOSPADM

## 2023-03-22 RX ORDER — SODIUM CHLORIDE 0.9 % (FLUSH) 0.9 %
5-40 SYRINGE (ML) INJECTION PRN
Status: DISCONTINUED | OUTPATIENT
Start: 2023-03-22 | End: 2023-03-22 | Stop reason: HOSPADM

## 2023-03-22 RX ORDER — PROPOFOL 10 MG/ML
INJECTION, EMULSION INTRAVENOUS PRN
Status: DISCONTINUED | OUTPATIENT
Start: 2023-03-22 | End: 2023-03-22 | Stop reason: SDUPTHER

## 2023-03-22 RX ORDER — SODIUM CHLORIDE 9 MG/ML
INJECTION, SOLUTION INTRAVENOUS PRN
Status: DISCONTINUED | OUTPATIENT
Start: 2023-03-22 | End: 2023-03-22 | Stop reason: HOSPADM

## 2023-03-22 RX ORDER — ONDANSETRON 2 MG/ML
4 INJECTION INTRAMUSCULAR; INTRAVENOUS ONCE
Status: COMPLETED | OUTPATIENT
Start: 2023-03-22 | End: 2023-03-22

## 2023-03-22 RX ORDER — ROCURONIUM BROMIDE 10 MG/ML
INJECTION, SOLUTION INTRAVENOUS PRN
Status: DISCONTINUED | OUTPATIENT
Start: 2023-03-22 | End: 2023-03-22 | Stop reason: SDUPTHER

## 2023-03-22 RX ADMIN — ONDANSETRON 4 MG: 2 INJECTION INTRAMUSCULAR; INTRAVENOUS at 12:58

## 2023-03-22 RX ADMIN — MIDAZOLAM 2 MG: 1 INJECTION INTRAMUSCULAR; INTRAVENOUS at 11:00

## 2023-03-22 RX ADMIN — KETOROLAC TROMETHAMINE 30 MG: 30 INJECTION, SOLUTION INTRAMUSCULAR; INTRAVENOUS at 11:42

## 2023-03-22 RX ADMIN — FENTANYL CITRATE 25 MCG: 50 INJECTION, SOLUTION INTRAMUSCULAR; INTRAVENOUS at 12:47

## 2023-03-22 RX ADMIN — FENTANYL CITRATE 100 MCG: 50 INJECTION, SOLUTION INTRAMUSCULAR; INTRAVENOUS at 11:03

## 2023-03-22 RX ADMIN — SODIUM CHLORIDE, SODIUM LACTATE, POTASSIUM CHLORIDE, AND CALCIUM CHLORIDE: 600; 310; 30; 20 INJECTION, SOLUTION INTRAVENOUS at 10:11

## 2023-03-22 RX ADMIN — ROCURONIUM BROMIDE 50 MG: 10 INJECTION, SOLUTION INTRAVENOUS at 11:05

## 2023-03-22 RX ADMIN — HYDROMORPHONE HYDROCHLORIDE 0.5 MG: 1 INJECTION, SOLUTION INTRAMUSCULAR; INTRAVENOUS; SUBCUTANEOUS at 12:30

## 2023-03-22 RX ADMIN — CEFOXITIN SODIUM 2000 MG: 2 POWDER, FOR SOLUTION INTRAVENOUS at 11:10

## 2023-03-22 RX ADMIN — PROPOFOL 200 MG: 10 INJECTION, EMULSION INTRAVENOUS at 11:05

## 2023-03-22 RX ADMIN — HYDROMORPHONE HYDROCHLORIDE 0.5 MG: 1 INJECTION, SOLUTION INTRAMUSCULAR; INTRAVENOUS; SUBCUTANEOUS at 12:23

## 2023-03-22 RX ADMIN — METOCLOPRAMIDE 10 MG: 5 INJECTION, SOLUTION INTRAMUSCULAR; INTRAVENOUS at 11:42

## 2023-03-22 RX ADMIN — DEXAMETHASONE SODIUM PHOSPHATE 8 MG: 4 INJECTION, SOLUTION INTRAMUSCULAR; INTRAVENOUS at 11:14

## 2023-03-22 RX ADMIN — ONDANSETRON HYDROCHLORIDE 4 MG: 2 INJECTION INTRAMUSCULAR; INTRAVENOUS at 11:42

## 2023-03-22 RX ADMIN — LIDOCAINE HYDROCHLORIDE 90 MG: 20 INJECTION, SOLUTION EPIDURAL; INFILTRATION; INTRACAUDAL; PERINEURAL at 11:05

## 2023-03-22 RX ADMIN — HYDROMORPHONE HYDROCHLORIDE 1 MG: 1 INJECTION, SOLUTION INTRAMUSCULAR; INTRAVENOUS; SUBCUTANEOUS at 11:35

## 2023-03-22 RX ADMIN — SUGAMMADEX 200 MG: 100 INJECTION, SOLUTION INTRAVENOUS at 11:47

## 2023-03-22 RX ADMIN — GLYCOPYRROLATE 0.2 MG: 0.2 INJECTION INTRAMUSCULAR; INTRAVENOUS at 11:22

## 2023-03-22 ASSESSMENT — PAIN - FUNCTIONAL ASSESSMENT
PAIN_FUNCTIONAL_ASSESSMENT: 0-10
PAIN_FUNCTIONAL_ASSESSMENT: FACE, LEGS, ACTIVITY, CRY, AND CONSOLABILITY (FLACC)
PAIN_FUNCTIONAL_ASSESSMENT: 0-10
PAIN_FUNCTIONAL_ASSESSMENT: FACE, LEGS, ACTIVITY, CRY, AND CONSOLABILITY (FLACC)
PAIN_FUNCTIONAL_ASSESSMENT: 0-10
PAIN_FUNCTIONAL_ASSESSMENT: 0-10

## 2023-03-22 ASSESSMENT — PAIN DESCRIPTION - DESCRIPTORS
DESCRIPTORS: SHARP
DESCRIPTORS: ACHING;BURNING

## 2023-03-22 ASSESSMENT — PAIN DESCRIPTION - LOCATION
LOCATION: ABDOMEN
LOCATION: ABDOMEN

## 2023-03-22 ASSESSMENT — PAIN DESCRIPTION - ORIENTATION: ORIENTATION: RIGHT

## 2023-03-22 ASSESSMENT — PAIN SCALES - GENERAL
PAINLEVEL_OUTOF10: 9
PAINLEVEL_OUTOF10: 8

## 2023-03-22 NOTE — PERIOP NOTE
TRANSFER - IN REPORT:    Verbal report received from [facility] on Iven Decree  being received from PACU for routine progression of patient care      Report consisted of patient's Situation, Background, Assessment and   Recommendations(SBAR). Information from the following report(s) Intake/Output and MAR was reviewed with the receiving nurse. Opportunity for questions and clarification was provided. Assessment completed upon patient's arrival to unit and care assumed.

## 2023-03-22 NOTE — PERIOP NOTE
TRANSFER - IN REPORT:    Verbal report received from OR  on [facility]  being received from OR for routine post-op      Report consisted of patient's Situation, Background, Assessment and   Recommendations(SBAR). Information from the following report(s) Nurse Handoff Report, Adult Overview, Surgery Report, Intake/Output, MAR, Recent Results, and Cardiac Rhythm NSR  was reviewed with the receiving nurse. Opportunity for questions and clarification was provided. Assessment completed upon patient's arrival to unit and care assumed.

## 2023-03-22 NOTE — ANESTHESIA PRE PROCEDURE
Component Value Date/Time     03/21/2023 10:46 AM    K 3.9 03/21/2023 10:46 AM     03/21/2023 10:46 AM    CO2 28 03/21/2023 10:46 AM    BUN 8 03/21/2023 10:46 AM    CREATININE 0.74 03/21/2023 10:46 AM    GFRAA >60 09/13/2021 09:14 AM    AGRATIO 0.9 12/13/2022 10:30 AM    LABGLOM >60 03/21/2023 10:46 AM    GLUCOSE 100 03/21/2023 10:46 AM    PROT 7.7 12/13/2022 10:30 AM    CALCIUM 8.7 03/21/2023 10:46 AM    BILITOT 0.2 12/13/2022 10:30 AM    ALKPHOS 77 12/13/2022 10:30 AM    AST 10 12/13/2022 10:30 AM    ALT 16 12/13/2022 10:30 AM       POC Tests: No results for input(s): POCGLU, POCNA, POCK, POCCL, POCBUN, POCHEMO, POCHCT in the last 72 hours. Coags: No results found for: PROTIME, INR, APTT    HCG (If Applicable):   Lab Results   Component Value Date    PREGTESTUR Negative 03/22/2023        ABGs: No results found for: PHART, PO2ART, TSZ9IKV, BKK1MVB, BEART, F5QZNCJP     Type & Screen (If Applicable):  No results found for: LABABO, LABRH    Drug/Infectious Status (If Applicable):  No results found for: HIV, HEPCAB    COVID-19 Screening (If Applicable): No results found for: COVID19        Anesthesia Evaluation  Patient summary reviewed and Nursing notes reviewed no history of anesthetic complications:   Airway: Mallampati: II  TM distance: >3 FB   Neck ROM: full  Mouth opening: > = 3 FB   Dental: normal exam         Pulmonary:Negative Pulmonary ROS breath sounds clear to auscultation                             Cardiovascular:  Exercise tolerance: good (>4 METS),   (+) hypertension:,         Rhythm: regular  Rate: normal                    Neuro/Psych:   (+) psychiatric history:            GI/Hepatic/Renal:   (+) GERD: well controlled,           Endo/Other: Negative Endo/Other ROS                    Abdominal:             Vascular: negative vascular ROS.          Other Findings:           Anesthesia Plan      general     ASA 2     (Discussion about not relying on bcp or meds for one week post op due to

## 2023-03-22 NOTE — BRIEF OP NOTE
Brief Postoperative Note      Patient: Byron Kumari  YOB: 1990  MRN: [id number]    Date of Procedure: 3/22/2023    Pre-Op Diagnosis: BILIARY DYSKINESIA, FATTY LIVER    Post-Op Diagnosis: Same       Procedure(s):  LAPAROSCOPIC CHOLECYSTECTOMY , LIVER BIOPSY    Surgeon(s):  Cammy Skinner MD    Assistant:  Surgical Assistant: Torsten Lord    Anesthesia: General    Estimated Blood Loss (mL): Minimal    Complications: None    Specimens:   ID Type Source Tests Collected by Time Destination   A : GALLBLADDER AND CONTENTS Tissue Gallbladder SURGICAL PATHOLOGY Cammy Skinner MD 3/22/2023 1135    B : LIVER BIOPSY Tissue Liver SURGICAL PATHOLOGY Cammy Skinner MD 3/22/2023 1135        Implants:  * No implants in log *      Drains: * No LDAs found *    Findings: none    Electronically signed by Cammy Skinner MD on 3/22/2023 at 11:50 AM

## 2023-03-22 NOTE — PERIOP NOTE
Reviewed PTA medication list with patient/caregiver and patient/caregiver denies any additional medications. Patient admits to having a responsible adult care for them at home for at least 24 hours after surgery. Patient encouraged to use gown warming system and informed that using said warming gown to regulate body temperature prior to a procedure has been shown to help reduce the risks of blood clots and infection. Patient's pharmacy of choice verified and documented in PTA medication section. Dual skin assessment & fall risk band verification completed with Kurt Gimenez RN.

## 2023-03-22 NOTE — PERIOP NOTE
TRANSFER - OUT REPORT:    Verbal report given to Mk ENRIQUEZ  on [facility]  being transferred to phase 2  for routine post-op       Report consisted of patient's Situation, Background, Assessment and   Recommendations(SBAR). Information from the following report(s) Nurse Handoff Report, Adult Overview, Surgery Report, Intake/Output, MAR, and Cardiac Rhythm NSR  was reviewed with the receiving nurse. [place] Assessment: No data recorded  Lines:   Peripheral IV 03/22/23 Left;Posterior Hand (Active)   Site Assessment Clean, dry & intact 03/22/23 1236   Line Status Infusing 03/22/23 1236   Line Care Connections checked and tightened 03/22/23 1236   Phlebitis Assessment No symptoms 03/22/23 1236   Infiltration Assessment 0 03/22/23 1236   Dressing Status Clean, dry & intact 03/22/23 1236   Dressing Type Transparent 03/22/23 1236        Opportunity for questions and clarification was provided.       Patient transported with:  Registered Nurse

## 2023-03-22 NOTE — DISCHARGE INSTRUCTIONS
Post-Operative Discharge Instructions  Yisel Schilling. Ange Sagastume M.D.  [address] [place] [address], [facility],  Agustina Niharika Jama  [phone]    Patient: David Funes MRN: [id number]  CSN: [id number]    YOB: 1990  Age: 28 y.o. 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 Activity  Do not drive a car or operate any hazardous machinery the day of surgery. Rest quietly today. No bending or heavy lifting. You may resume other prior to surgery activities as tolerated. You may remove the bandage and shower in 1 day. Drain / Wound Care  Follow all drain / wound care instructions exactly as explained by the Nurse at time of discharge. Apply an ice pack to the surgical site for 48 hours. Do not put any salves or ointments on the wound. Allow it to form a dry scab. Leave steri-strips / Dermabond alone. They should be allowed to fall off on their own in 7-14 days. Medications  It is important to take your medications exactly as they are prescribed. Keep your medication in the bottles provided by the pharmacist, and keep a list of the medication names, dosages, and times they should be taken in your wallet. Call 911 anytime you think you may need emergency care. For example, call if:  You passed out (lost consciousness). You have severe trouble breathing. You have sudden chest pain and shortness of breath. Notify your Surgeon for any of the following:  Fever, chills, nausea, vomiting, severe abdominal pain or bleeding. If you experience any redness or discharge or sign of infection. Persistent nausea lasting more than 24 hours.     If you are unable to reach your Surgeon for any of the symptoms above, you should proceed directly to the nearest Emergency physical exercise & weight monitoring are important for maintaining a healthy lifestyle    You may be retaining fluid if you have a history of heart failure or if you experience any of the following symptoms:  Weight gain of 3 pounds or more overnight or 5 pounds in a week, increased swelling in our hands or feet or shortness of breath while lying flat in bed. Please call your doctor as soon as you notice any of these symptoms; do not wait until your next office visit. Patient {ARMBANDS:[id number]}     The discharge information has been reviewed with the patient and caregiver. The patient and caregiver verbalized understanding. Discharge medications reviewed with the patient and caregiver and appropriate educational materials and side effects teaching were provided.   ___________________________________________________________________________________________________________________________________

## 2023-03-22 NOTE — H&P
Assessment/Plan  #  Detail Type  Description   1. Assessment  Disease of gallbladder, unspecified (K82.9). Patient Plan  Discussed her Dx and options. Recommend lap sara. Discussed Dx and options. Recommend lap sara, possible liver biopsy. Discussed risks, benefits and alternatives including, bleeding, infection, bile leak, CBD/liver injury, open procedure, partial cholecystectomy, MI, DVT, death. They understand and wish to proceed. 2.  Assessment  Fatty liver (K76.0). 3.  Assessment  Renal calculi (N20.0). This 28year old female presents for Gallbladder Disease. History of Present Illness  1. Gallbladder Disease   The problem is with no change. It occurs intermittently. Location is epigastric, RUQ and LUQ. There is radiation to back. The patient describes it as colicky. Context includes after meals. Additional information: Found to have biliary dyskinesia with abnormal HIDA. U/S shows to gallstones. .        Problem List  Problem Description  Onset Date  Chronic  Clinical Status  Notes  Hypertension    Y          Medications (active prior to today)  Medication  Instructions  Start Date  Stop Date  Refilled  Elsewhere  amlodipine 5 mg tablet    01/09/2023      Y  butalbital-acetaminophen-caffeine 50 mg-325 mg-40 mg tablet  take 1 tablet by mouth every 4 hours if needed  06/01/2022      Y  cephalexin 500 mg capsule    02/07/2023      Y  citalopram 20 mg tablet    03/06/2023      Y  dicyclomine 10 mg capsule    02/11/2023      Y  doxycycline hyclate 100 mg capsule  take 1 capsule by mouth twice a day for 5 days  05/31/2022      Y  doxycycline hyclate 100 mg tablet  take 1 tablet by mouth twice a day  08/25/2022      Y  fluconazole 150 mg tablet  take 1 tablet by mouth AS A ONE TIME DOSE  02/07/2023      Y  hydroxyzine HCl 25 mg tablet  take 1 tablet by mouth every 8 hours if needed for itching  02/24/2023      Y  ibuprofen 800 mg tablet  take 1 tablet by mouth every 6 loss.  ENMT  Negative  Ear drainage, Hearing loss, Nasal drainage, Otalgia, Sinus pressure and Sore throat. Eyes  Negative  Eye discharge, Eye pain and Vision changes. Respiratory  Negative  Chronic cough, Cough, Dyspnea, Known TB exposure and Wheezing. Cardio  Negative  Chest pain, Claudication, Edema and Irregular heartbeat/palpitations. GI  Positive  Abdominal pain, Nausea, Vomiting. Physical Exam  Exam  Findings  Details  Constitutional  Normal  Well developed. Eyes  Normal  Conjunctiva - Right: Normal, Left: Normal. Pupil - Right: Normal, Left: Normal.  Neck Exam  Normal  Inspection - Normal.  Respiratory  Normal  Inspection - Normal. Auscultation - Normal. Effort - Normal.  Cardiovascular  Normal  Rhythm - Regular. Extra sounds - None. Murmurs - None. Vascular  Normal  Capillary refill - Less than 2 seconds. Abdomen  Normal  Inspection - Normal. No abdominal tenderness. No hepatic enlargement. No spleen enlargement. No hernia. Musculoskeletal  Normal  Visual overview of all four extremities is normal.  Extremity  Normal  No edema. Neurological  Normal  Memory - Normal. Cranial nerves - Cranial nerves II through XII grossly intact. Psychiatric  Normal  Orientation - Oriented to time, place, person & situation. Appropriate mood and affect.         Medications (added, continued, or stopped this visit)  Start Date  Medication  Directions  PRN Status  PRN Reason  Instruction  Stop Date  01/09/2023  amlodipine 5 mg tablet    N        06/01/2022  butalbital-acetaminophen-caffeine 50 mg-325 mg-40 mg tablet  take 1 tablet by mouth every 4 hours if needed  N        02/07/2023  cephalexin 500 mg capsule    N        03/06/2023  citalopram 20 mg tablet    N        02/11/2023  dicyclomine 10 mg capsule    N        05/31/2022  doxycycline hyclate 100 mg capsule  take 1 capsule by mouth twice a day for 5 days  N        08/25/2022  doxycycline hyclate 100 mg tablet  take 1 tablet by mouth twice a

## 2023-03-22 NOTE — OP NOTE
condition. Estimated Blood Loss: 5 mL    Specimens:   ID Type Source Tests Collected by Time Destination   A : GALLBLADDER AND CONTENTS Tissue Gallbladder SURGICAL PATHOLOGY Kirill Alford MD 3/22/2023 1135    B : LIVER BIOPSY Tissue Liver SURGICAL PATHOLOGY Kirill Alford MD 3/22/2023 1135         Drains:  Help Text      This SmartLink retrieves the last documented value for [facility] assessment data, retrieved by either LDA type or by LDA group ID. This SmartLink should be used in a SmartText or SmartPhrase. If one is not available, please contact your . Complications: None; the patient tolerated the procedure well.     Kirill Alford MD  3/22/2023  3:02 PM

## 2023-03-22 NOTE — ANESTHESIA POSTPROCEDURE EVALUATION
Department of Anesthesiology  Postprocedure Note    Patient: Zain Eddy  MRN: [id number]  YOB: 1990  Date of evaluation: 3/22/2023      Procedure Summary     Date: 03/22/23 Room / Location: [facility] 03 / [facility] OR    Anesthesia Start: 1100 Anesthesia Stop: 0136    Procedure: LAPAROSCOPIC CHOLECYSTECTOMY , LIVER BIOPSY (Abdomen) Diagnosis:       Disease of gallbladder      Fatty metamorphosis of liver      (BILIARY DYSKINESIA, FATTY LIVER)    Surgeons: Angle Wells MD Responsible Provider: Anthony Aguirre MD    Anesthesia Type: General ASA Status: 2          Anesthesia Type: General    Yamile Phase I: Yamile Score: 8    Yamile Phase II:        Anesthesia Post Evaluation    Patient location during evaluation: PACU  Patient participation: complete - patient participated  Level of consciousness: awake  Pain score: 0  Airway patency: patent  Nausea & Vomiting: no nausea and no vomiting  Complications: no  Cardiovascular status: blood pressure returned to baseline  Respiratory status: acceptable  Hydration status: stable  Multimodal analgesia pain management approach

## 2024-04-19 NOTE — H&P
Paternal grandmother    breast cancer  N     Social History  (Detailed)  Preferred language is English.      Education/Employment/Occupation  Employment History Status Retired Restrictions     full-time     Marital Status/Family/Social Support  Marital status:      Children  Has children:  1 son(s).    Tobacco use status: Light cigarette smoker (1-9 cigs/day).    Smoking status: Light tobacco smoker.    Alcohol  There is a history of alcohol use.   Type: Wine. 1 glass consumed weekly.  Last alcoholic drink was two weeks ago.      Caffeine  The patient uses caffeine: soda and coffee. - 1 cup a day.        Vital Signs   Height  Time ft in cm Last Measured Height Position    4:47 PM 5.0 4.00 162.56 02/13/2024 Standing     Weight/BSA/BMI  Time lb oz kg Context BMI kg/m2 BSA m2    4:47 .00  83.007 dressed with shoes 31.41 1.94     Measured by  Time Measured by    4:47 PM Tomasa Parekh         Physical Exam  Exam Findings Details   Constitutional Normal Well developed.   Psychiatric Normal Orientation - Oriented to time, place, person & situation. Appropriate mood and affect.     Assessment/Plan  # Detail Type Description    1. Assessment Excessive and frequent menstruation with regular cycle (N92.0).    Patient Plan Scheduled for Robotic assisted TLH/bilateral salpingectomy.     The procedure was discussed with the patient in detail.  She understands that the risks include but are not limited to: Heavy bleeding, need for blood transfusion, infection, injury to internal or adjacent organs, risks associated with anesthesia, wound infection or separation, DVT, pulmonary embolus, pneumonia and death associated with surgery.  All questions were answered and the patient wants to proceed.         2. Assessment Secondary dysmenorrhea (N94.5).              Medications (Added, Continued or Stopped today)  Start Date Medication Directions PRN Status PRN Reason Instruction Stop Date   01/09/2023     10/25/2022 sulfamethoxazole 800 mg-trimethoprim 160 mg tablet take 1 tablet by mouth twice a day for 10 days N      12/13/2022 tamsulosin 0.4 mg capsule  N        Prescription Drug Monitoring Report: Accessed by Isra Suarez DO on 4/16/2024 4:52:31 PM    Service CPT Mod Dx 1 Dx 2 Dx 3 Dx 4   OFFICE/OUTPATIENT VISIT, EST 34556  N92.0 N94.5     MED LIST DOCD IN [facility] 1159F  Z00.00        ndc cpt4    1159F    63344     Provider  Isra Suarez 04/16/2024 4:53 PM   Document generated by: Isar Suarez 04/16/2024 04:53 PM      ----------------------------------------------------------------------------------------------------------------------------------------------------------------------      Electronically signed by Isra Suarez DO on 04/16/2024 05:00 PM

## 2024-04-26 ENCOUNTER — HOSPITAL ENCOUNTER (OUTPATIENT)
Facility: HOSPITAL | Age: 34
End: 2024-04-26
Payer: MEDICAID

## 2024-04-26 ENCOUNTER — TRANSCRIBE ORDERS (OUTPATIENT)
Facility: HOSPITAL | Age: 34
End: 2024-04-26

## 2024-04-26 DIAGNOSIS — R87.619 ABNORMAL GLANDULAR PAPANICOLAOU SMEAR OF CERVIX: ICD-10-CM

## 2024-04-26 DIAGNOSIS — N94.5 SECONDARY DYSMENORRHEA: ICD-10-CM

## 2024-04-26 DIAGNOSIS — N92.0 EXCESSIVE OR FREQUENT MENSTRUATION: ICD-10-CM

## 2024-04-26 DIAGNOSIS — R87.619 ABNORMAL GLANDULAR PAPANICOLAOU SMEAR OF CERVIX: Primary | ICD-10-CM

## 2024-04-26 LAB
ABO + RH BLD: NORMAL
BASOPHILS # BLD: 0 K/UL (ref 0–0.1)
BASOPHILS NFR BLD: 0 % (ref 0–2)
BLOOD GROUP ANTIBODIES SERPL: NORMAL
DIFFERENTIAL METHOD BLD: ABNORMAL
EOSINOPHIL # BLD: 0.1 K/UL (ref 0–0.4)
EOSINOPHIL NFR BLD: 2 % (ref 0–5)
ERYTHROCYTE [DISTWIDTH] IN BLOOD BY AUTOMATED COUNT: 16.9 % (ref 11.6–14.5)
HCT VFR BLD AUTO: 35.1 % (ref 35–45)
HGB BLD-MCNC: 10.7 G/DL (ref 12–16)
IMM GRANULOCYTES # BLD AUTO: 0 K/UL (ref 0–0.04)
IMM GRANULOCYTES NFR BLD AUTO: 0 % (ref 0–0.5)
LYMPHOCYTES # BLD: 2.5 K/UL (ref 0.9–3.6)
LYMPHOCYTES NFR BLD: 43 % (ref 21–52)
MCH RBC QN AUTO: 24.1 PG (ref 24–34)
MCHC RBC AUTO-ENTMCNC: 30.5 G/DL (ref 31–37)
MCV RBC AUTO: 79.1 FL (ref 78–100)
MONOCYTES # BLD: 0.5 K/UL (ref 0.05–1.2)
MONOCYTES NFR BLD: 9 % (ref 3–10)
NEUTS SEG # BLD: 2.6 K/UL (ref 1.8–8)
NEUTS SEG NFR BLD: 45 % (ref 40–73)
NRBC # BLD: 0 K/UL (ref 0–0.01)
NRBC BLD-RTO: 0 PER 100 WBC
PLATELET # BLD AUTO: 288 K/UL (ref 135–420)
PMV BLD AUTO: 12 FL (ref 9.2–11.8)
RBC # BLD AUTO: 4.44 M/UL (ref 4.2–5.3)
SPECIMEN EXP DATE BLD: NORMAL
WBC # BLD AUTO: 5.7 K/UL (ref 4.6–13.2)

## 2024-04-26 PROCEDURE — 86850 RBC ANTIBODY SCREEN: CPT

## 2024-04-26 PROCEDURE — 86901 BLOOD TYPING SEROLOGIC RH(D): CPT

## 2024-04-26 PROCEDURE — 86900 BLOOD TYPING SEROLOGIC ABO: CPT

## 2024-04-26 PROCEDURE — 36415 COLL VENOUS BLD VENIPUNCTURE: CPT

## 2024-04-26 PROCEDURE — 85025 COMPLETE CBC W/AUTO DIFF WBC: CPT

## 2024-05-02 ENCOUNTER — ANESTHESIA (OUTPATIENT)
Facility: HOSPITAL | Age: 34
End: 2024-05-02
Payer: MEDICAID

## 2024-05-02 ENCOUNTER — ANESTHESIA EVENT (OUTPATIENT)
Facility: HOSPITAL | Age: 34
End: 2024-05-02
Payer: MEDICAID

## 2024-05-02 ENCOUNTER — HOSPITAL ENCOUNTER (OUTPATIENT)
Facility: HOSPITAL | Age: 34
Setting detail: OUTPATIENT SURGERY
Discharge: HOME | End: 2024-05-02
Attending: OBSTETRICS & GYNECOLOGY | Admitting: OBSTETRICS & GYNECOLOGY
Payer: MEDICAID

## 2024-05-02 VITALS
RESPIRATION RATE: 16 BRPM | HEIGHT: 65 IN | BODY MASS INDEX: 31.32 KG/M2 | HEART RATE: 77 BPM | SYSTOLIC BLOOD PRESSURE: 124 MMHG | OXYGEN SATURATION: 96 % | WEIGHT: 188 LBS | TEMPERATURE: 97 F | DIASTOLIC BLOOD PRESSURE: 73 MMHG

## 2024-05-02 LAB — HCG UR QL: NEGATIVE

## 2024-05-02 PROCEDURE — 6360000002 HC RX W HCPCS: Performed by: ANESTHESIOLOGY

## 2024-05-02 PROCEDURE — 7100000000 HC PACU RECOVERY - FIRST 15 MIN: Performed by: OBSTETRICS & GYNECOLOGY

## 2024-05-02 PROCEDURE — 7100000010 HC PHASE II RECOVERY - FIRST 15 MIN: Performed by: OBSTETRICS & GYNECOLOGY

## 2024-05-02 PROCEDURE — 81025 URINE PREGNANCY TEST: CPT

## 2024-05-02 PROCEDURE — 3600000019 HC SURGERY ROBOT ADDTL 15MIN: Performed by: OBSTETRICS & GYNECOLOGY

## 2024-05-02 PROCEDURE — 6360000002 HC RX W HCPCS: Performed by: OBSTETRICS & GYNECOLOGY

## 2024-05-02 PROCEDURE — 2500000003 HC RX 250 WO HCPCS: Performed by: NURSE ANESTHETIST, CERTIFIED REGISTERED

## 2024-05-02 PROCEDURE — 3700000001 HC ADD 15 MINUTES (ANESTHESIA): Performed by: OBSTETRICS & GYNECOLOGY

## 2024-05-02 PROCEDURE — 7100000011 HC PHASE II RECOVERY - ADDTL 15 MIN: Performed by: OBSTETRICS & GYNECOLOGY

## 2024-05-02 PROCEDURE — 3700000000 HC ANESTHESIA ATTENDED CARE: Performed by: OBSTETRICS & GYNECOLOGY

## 2024-05-02 PROCEDURE — 2500000003 HC RX 250 WO HCPCS: Performed by: OBSTETRICS & GYNECOLOGY

## 2024-05-02 PROCEDURE — 6360000002 HC RX W HCPCS: Performed by: NURSE ANESTHETIST, CERTIFIED REGISTERED

## 2024-05-02 PROCEDURE — S2900 ROBOTIC SURGICAL SYSTEM: HCPCS | Performed by: OBSTETRICS & GYNECOLOGY

## 2024-05-02 PROCEDURE — 7100000001 HC PACU RECOVERY - ADDTL 15 MIN: Performed by: OBSTETRICS & GYNECOLOGY

## 2024-05-02 PROCEDURE — 6370000000 HC RX 637 (ALT 250 FOR IP): Performed by: ANESTHESIOLOGY

## 2024-05-02 PROCEDURE — 2580000003 HC RX 258: Performed by: OBSTETRICS & GYNECOLOGY

## 2024-05-02 PROCEDURE — 88307 TISSUE EXAM BY PATHOLOGIST: CPT

## 2024-05-02 PROCEDURE — 2709999900 HC NON-CHARGEABLE SUPPLY: Performed by: OBSTETRICS & GYNECOLOGY

## 2024-05-02 PROCEDURE — 3600000009 HC SURGERY ROBOT BASE: Performed by: OBSTETRICS & GYNECOLOGY

## 2024-05-02 RX ORDER — MIDAZOLAM HYDROCHLORIDE 1 MG/ML
INJECTION INTRAMUSCULAR; INTRAVENOUS PRN
Status: DISCONTINUED | OUTPATIENT
Start: 2024-05-02 | End: 2024-05-02 | Stop reason: SDUPTHER

## 2024-05-02 RX ORDER — OXYCODONE HYDROCHLORIDE 5 MG/1
5 TABLET ORAL
Status: COMPLETED | OUTPATIENT
Start: 2024-05-02 | End: 2024-05-02

## 2024-05-02 RX ORDER — FENTANYL CITRATE 50 UG/ML
50 INJECTION, SOLUTION INTRAMUSCULAR; INTRAVENOUS EVERY 5 MIN PRN
Status: DISCONTINUED | OUTPATIENT
Start: 2024-05-02 | End: 2024-05-02 | Stop reason: HOSPADM

## 2024-05-02 RX ORDER — SODIUM CHLORIDE 0.9 % (FLUSH) 0.9 %
5-40 SYRINGE (ML) INJECTION PRN
Status: DISCONTINUED | OUTPATIENT
Start: 2024-05-02 | End: 2024-05-02 | Stop reason: HOSPADM

## 2024-05-02 RX ORDER — SODIUM CHLORIDE, SODIUM LACTATE, POTASSIUM CHLORIDE, CALCIUM CHLORIDE 600; 310; 30; 20 MG/100ML; MG/100ML; MG/100ML; MG/100ML
INJECTION, SOLUTION INTRAVENOUS CONTINUOUS
Status: DISCONTINUED | OUTPATIENT
Start: 2024-05-02 | End: 2024-05-02 | Stop reason: HOSPADM

## 2024-05-02 RX ORDER — ONDANSETRON 2 MG/ML
INJECTION INTRAMUSCULAR; INTRAVENOUS PRN
Status: DISCONTINUED | OUTPATIENT
Start: 2024-05-02 | End: 2024-05-02 | Stop reason: SDUPTHER

## 2024-05-02 RX ORDER — SODIUM CHLORIDE 9 MG/ML
INJECTION, SOLUTION INTRAVENOUS PRN
Status: DISCONTINUED | OUTPATIENT
Start: 2024-05-02 | End: 2024-05-02 | Stop reason: HOSPADM

## 2024-05-02 RX ORDER — ACETAMINOPHEN 325 MG/1
650 TABLET ORAL
Status: DISCONTINUED | OUTPATIENT
Start: 2024-05-02 | End: 2024-05-02 | Stop reason: HOSPADM

## 2024-05-02 RX ORDER — LABETALOL HYDROCHLORIDE 5 MG/ML
10 INJECTION, SOLUTION INTRAVENOUS
Status: DISCONTINUED | OUTPATIENT
Start: 2024-05-02 | End: 2024-05-02 | Stop reason: HOSPADM

## 2024-05-02 RX ORDER — GLYCOPYRROLATE 0.2 MG/ML
INJECTION INTRAMUSCULAR; INTRAVENOUS PRN
Status: DISCONTINUED | OUTPATIENT
Start: 2024-05-02 | End: 2024-05-02 | Stop reason: SDUPTHER

## 2024-05-02 RX ORDER — ROCURONIUM BROMIDE 10 MG/ML
INJECTION, SOLUTION INTRAVENOUS PRN
Status: DISCONTINUED | OUTPATIENT
Start: 2024-05-02 | End: 2024-05-02 | Stop reason: SDUPTHER

## 2024-05-02 RX ORDER — BUPIVACAINE HYDROCHLORIDE 5 MG/ML
INJECTION, SOLUTION EPIDURAL; INTRACAUDAL PRN
Status: DISCONTINUED | OUTPATIENT
Start: 2024-05-02 | End: 2024-05-02 | Stop reason: ALTCHOICE

## 2024-05-02 RX ORDER — MEPERIDINE HYDROCHLORIDE 50 MG/ML
12.5 INJECTION INTRAMUSCULAR; INTRAVENOUS; SUBCUTANEOUS EVERY 5 MIN PRN
Status: DISCONTINUED | OUTPATIENT
Start: 2024-05-02 | End: 2024-05-02 | Stop reason: HOSPADM

## 2024-05-02 RX ORDER — ONDANSETRON 2 MG/ML
4 INJECTION INTRAMUSCULAR; INTRAVENOUS
Status: COMPLETED | OUTPATIENT
Start: 2024-05-02 | End: 2024-05-02

## 2024-05-02 RX ORDER — LIDOCAINE HYDROCHLORIDE 20 MG/ML
INJECTION, SOLUTION EPIDURAL; INFILTRATION; INTRACAUDAL; PERINEURAL PRN
Status: DISCONTINUED | OUTPATIENT
Start: 2024-05-02 | End: 2024-05-02 | Stop reason: SDUPTHER

## 2024-05-02 RX ORDER — PROPOFOL 10 MG/ML
INJECTION, EMULSION INTRAVENOUS PRN
Status: DISCONTINUED | OUTPATIENT
Start: 2024-05-02 | End: 2024-05-02 | Stop reason: SDUPTHER

## 2024-05-02 RX ORDER — DIPHENHYDRAMINE HYDROCHLORIDE 50 MG/ML
12.5 INJECTION INTRAMUSCULAR; INTRAVENOUS
Status: DISCONTINUED | OUTPATIENT
Start: 2024-05-02 | End: 2024-05-02 | Stop reason: HOSPADM

## 2024-05-02 RX ORDER — DEXMEDETOMIDINE HYDROCHLORIDE 100 UG/ML
INJECTION, SOLUTION INTRAVENOUS PRN
Status: DISCONTINUED | OUTPATIENT
Start: 2024-05-02 | End: 2024-05-02 | Stop reason: SDUPTHER

## 2024-05-02 RX ORDER — HALOPERIDOL 5 MG/ML
1 INJECTION INTRAMUSCULAR
Status: DISCONTINUED | OUTPATIENT
Start: 2024-05-02 | End: 2024-05-02 | Stop reason: HOSPADM

## 2024-05-02 RX ORDER — HYDROMORPHONE HYDROCHLORIDE 1 MG/ML
0.25 INJECTION, SOLUTION INTRAMUSCULAR; INTRAVENOUS; SUBCUTANEOUS EVERY 5 MIN PRN
Status: COMPLETED | OUTPATIENT
Start: 2024-05-02 | End: 2024-05-02

## 2024-05-02 RX ORDER — NEOSTIGMINE METHYLSULFATE 1 MG/ML
INJECTION, SOLUTION INTRAVENOUS PRN
Status: DISCONTINUED | OUTPATIENT
Start: 2024-05-02 | End: 2024-05-02 | Stop reason: SDUPTHER

## 2024-05-02 RX ORDER — SODIUM CHLORIDE 0.9 % (FLUSH) 0.9 %
5-40 SYRINGE (ML) INJECTION EVERY 12 HOURS SCHEDULED
Status: DISCONTINUED | OUTPATIENT
Start: 2024-05-02 | End: 2024-05-02 | Stop reason: HOSPADM

## 2024-05-02 RX ORDER — KETAMINE HCL IN NACL, ISO-OSM 100MG/10ML
SYRINGE (ML) INJECTION PRN
Status: DISCONTINUED | OUTPATIENT
Start: 2024-05-02 | End: 2024-05-02 | Stop reason: SDUPTHER

## 2024-05-02 RX ORDER — DEXAMETHASONE SODIUM PHOSPHATE 4 MG/ML
INJECTION, SOLUTION INTRA-ARTICULAR; INTRALESIONAL; INTRAMUSCULAR; INTRAVENOUS; SOFT TISSUE PRN
Status: DISCONTINUED | OUTPATIENT
Start: 2024-05-02 | End: 2024-05-02 | Stop reason: SDUPTHER

## 2024-05-02 RX ORDER — IPRATROPIUM BROMIDE AND ALBUTEROL SULFATE 2.5; .5 MG/3ML; MG/3ML
1 SOLUTION RESPIRATORY (INHALATION)
Status: DISCONTINUED | OUTPATIENT
Start: 2024-05-02 | End: 2024-05-02 | Stop reason: HOSPADM

## 2024-05-02 RX ORDER — NALOXONE HYDROCHLORIDE 0.4 MG/ML
INJECTION, SOLUTION INTRAMUSCULAR; INTRAVENOUS; SUBCUTANEOUS PRN
Status: DISCONTINUED | OUTPATIENT
Start: 2024-05-02 | End: 2024-05-02 | Stop reason: HOSPADM

## 2024-05-02 RX ADMIN — LIDOCAINE HYDROCHLORIDE 80 MG: 20 INJECTION, SOLUTION EPIDURAL; INFILTRATION; INTRACAUDAL; PERINEURAL at 09:42

## 2024-05-02 RX ADMIN — DEXMEDETOMIDINE HYDROCHLORIDE 10 MCG: 100 INJECTION, SOLUTION INTRAVENOUS at 10:08

## 2024-05-02 RX ADMIN — ROCURONIUM BROMIDE 50 MG: 10 INJECTION, SOLUTION INTRAVENOUS at 09:42

## 2024-05-02 RX ADMIN — SODIUM CHLORIDE, POTASSIUM CHLORIDE, SODIUM LACTATE AND CALCIUM CHLORIDE: 600; 310; 30; 20 INJECTION, SOLUTION INTRAVENOUS at 08:34

## 2024-05-02 RX ADMIN — GLYCOPYRROLATE 0.4 MG: 0.2 INJECTION INTRAMUSCULAR; INTRAVENOUS at 10:38

## 2024-05-02 RX ADMIN — ONDANSETRON HYDROCHLORIDE 4 MG: 2 INJECTION INTRAMUSCULAR; INTRAVENOUS at 10:35

## 2024-05-02 RX ADMIN — WATER 2000 MG: 1 INJECTION INTRAMUSCULAR; INTRAVENOUS; SUBCUTANEOUS at 09:45

## 2024-05-02 RX ADMIN — MIDAZOLAM 2 MG: 1 INJECTION INTRAMUSCULAR; INTRAVENOUS at 09:30

## 2024-05-02 RX ADMIN — HYDROMORPHONE HYDROCHLORIDE 0.25 MG: 1 INJECTION, SOLUTION INTRAMUSCULAR; INTRAVENOUS; SUBCUTANEOUS at 11:17

## 2024-05-02 RX ADMIN — DEXAMETHASONE SODIUM PHOSPHATE 4 MG: 4 INJECTION, SOLUTION INTRAMUSCULAR; INTRAVENOUS at 09:56

## 2024-05-02 RX ADMIN — NEOSTIGMINE METHYLSULFATE 3 MG: 1 INJECTION, SOLUTION INTRAVENOUS at 10:38

## 2024-05-02 RX ADMIN — PROPOFOL 200 MG: 10 INJECTION, EMULSION INTRAVENOUS at 09:42

## 2024-05-02 RX ADMIN — Medication 20 MG: at 10:13

## 2024-05-02 RX ADMIN — OXYCODONE HYDROCHLORIDE 5 MG: 5 TABLET ORAL at 12:12

## 2024-05-02 RX ADMIN — ONDANSETRON 4 MG: 2 INJECTION INTRAMUSCULAR; INTRAVENOUS at 12:11

## 2024-05-02 RX ADMIN — HYDROMORPHONE HYDROCHLORIDE 1 MG: 1 INJECTION, SOLUTION INTRAMUSCULAR; INTRAVENOUS; SUBCUTANEOUS at 09:30

## 2024-05-02 RX ADMIN — HYDROMORPHONE HYDROCHLORIDE 0.25 MG: 1 INJECTION, SOLUTION INTRAMUSCULAR; INTRAVENOUS; SUBCUTANEOUS at 11:12

## 2024-05-02 RX ADMIN — Medication 30 MG: at 09:58

## 2024-05-02 ASSESSMENT — PAIN SCALES - GENERAL
PAINLEVEL_OUTOF10: 6
PAINLEVEL_OUTOF10: 0
PAINLEVEL_OUTOF10: 6
PAINLEVEL_OUTOF10: 7
PAINLEVEL_OUTOF10: 0

## 2024-05-02 ASSESSMENT — PAIN - FUNCTIONAL ASSESSMENT
PAIN_FUNCTIONAL_ASSESSMENT: ACTIVITIES ARE NOT PREVENTED
PAIN_FUNCTIONAL_ASSESSMENT: 0-10
PAIN_FUNCTIONAL_ASSESSMENT: ACTIVITIES ARE NOT PREVENTED
PAIN_FUNCTIONAL_ASSESSMENT: 0-10

## 2024-05-02 ASSESSMENT — PAIN DESCRIPTION - PAIN TYPE
TYPE: SURGICAL PAIN
TYPE: SURGICAL PAIN

## 2024-05-02 ASSESSMENT — PAIN DESCRIPTION - ORIENTATION
ORIENTATION: LOWER
ORIENTATION: LOWER

## 2024-05-02 ASSESSMENT — PAIN DESCRIPTION - ONSET
ONSET: ON-GOING
ONSET: ON-GOING

## 2024-05-02 ASSESSMENT — PAIN DESCRIPTION - DESCRIPTORS
DESCRIPTORS: CRAMPING
DESCRIPTORS: ACHING;CRAMPING

## 2024-05-02 ASSESSMENT — PAIN DESCRIPTION - FREQUENCY
FREQUENCY: INTERMITTENT
FREQUENCY: INTERMITTENT

## 2024-05-02 ASSESSMENT — PAIN DESCRIPTION - LOCATION
LOCATION: ABDOMEN

## 2024-05-02 NOTE — PERIOP NOTE
TRANSFER - IN REPORT:    Verbal report received from Caroline fletcher on Chela Cui  being received from pacu for routine post-op      Report consisted of patient's Situation, Background, Assessment and   Recommendations(SBAR).     Information from the following report(s) Nurse Handoff Report was reviewed with the receiving nurse.    Opportunity for questions and clarification was provided.      Assessment completed upon patient's arrival to unit and care assumed.

## 2024-05-02 NOTE — PERIOP NOTE
TRANSFER - IN REPORT:    Verbal report received from OR nurse and CRNA on Chela Cui  being received from OR for routine post-op      Report consisted of patient's Situation, Background, Assessment and   Recommendations(SBAR).     Information from the following report(s) Nurse Handoff Report, Adult Overview, Surgery Report, Intake/Output, MAR, Recent Results, and Med Rec Status was reviewed with the receiving nurse.    Opportunity for questions and clarification was provided.      Assessment completed upon patient's arrival to unit and care assumed.

## 2024-05-02 NOTE — PERIOP NOTE
Reviewed PTA medication list with patient/caregiver and patient/caregiver denies any additional medications.     Patient admits to having a responsible adult care for them at home for at least 24 hours after surgery.    Patient encouraged to use gown warming system and informed that using said warming gown to regulate body temperature prior to a procedure has been shown to help reduce the risks of blood clots and infection.    Patient's pharmacy of choice verified and documented in PTA medication section.    Dual skin assessment & fall risk band verification completed with ZOE Chang.     Urine pregnancy results NEGATIVE and verified with CEDRIC.

## 2024-05-02 NOTE — PERIOP NOTE
TRANSFER - OUT REPORT:    Verbal report given to ZOE Roldan on Chela Cui  being transferred to phase 2 for routine post-op       Report consisted of patient's Situation, Background, Assessment and   Recommendations(SBAR).     Information from the following report(s) Nurse Handoff Report, Adult Overview, Surgery Report, Intake/Output, MAR, Recent Results, and Med Rec Status was reviewed with the receiving nurse.           Lines:   Peripheral IV 05/02/24 Right Antecubital (Active)   Site Assessment Other (Comment) 05/02/24 1100       Peripheral IV 05/02/24 Left Antecubital (Active)   Site Assessment Clean, dry & intact 05/02/24 1100   Line Status Infusing 05/02/24 1100   Phlebitis Assessment No symptoms 05/02/24 1100   Infiltration Assessment 0 05/02/24 1100   Dressing Status Clean, dry & intact 05/02/24 1100   Dressing Type Transparent 05/02/24 1100        Opportunity for questions and clarification was provided.      Patient transported with:  Registered Nurse

## 2024-05-02 NOTE — ANESTHESIA PRE PROCEDURE
Department of Anesthesiology  Preprocedure Note       Name:  Chela Cui   Age:  33 y.o.  :  1990                                          MRN:  [id number]         Date:  2024      Surgeon: Surgeon(s):  Isra Suarez MD    Procedure: Procedure(s):  ROBOTIC ASSISTED TOTAL LAPAROSCOPIC HYSTERECTOMY, BILATERAL SALPINGECTOMY ERAS    Medications prior to admission:   Prior to Admission medications    Medication Sig Start Date End Date Taking? Authorizing Provider   APPLE CIDER VINEGAR PO Take by mouth    Olga Lidia Rodriguez MD   ibuprofen (ADVIL;MOTRIN) 200 MG tablet Take 1 tablet by mouth every 6 hours as needed for Pain    Olga Lidia Rodriguez MD   aspirin-acetaminophen-caffeine (EXCEDRIN MIGRAINE) 250-250-65 MG per tablet Take 1 tablet by mouth every 6 hours as needed for Headaches    Olga Lidia Rodriguez MD   hydrOXYzine HCl (ATARAX) 25 MG tablet Take 1 tablet by mouth 3 times daily as needed for Itching    Olga Lidia Rodriguez MD   promethazine (PHENERGAN) 12.5 MG tablet Take 1 tablet by mouth every 6 hours as needed for Nausea    Olga Lidia Rodriguez MD   citalopram (CELEXA) 20 MG tablet Take 1 tablet by mouth daily Indications: Feeling Anxious    Olga Lidia Rodriguez MD   methIMAzole (TAPAZOLE) 10 MG tablet Take 1 tablet by mouth daily    Olga Lidia Rodriguez MD   temazepam (RESTORIL) 15 MG capsule Take 1 capsule by mouth nightly as needed for Sleep.    Olga Lidia Rodriguez MD   amLODIPine (NORVASC) 5 MG tablet Take 1 tablet by mouth daily    Olga Lidia Rodriguez MD   dicyclomine (BENTYL) 10 MG capsule Take 1 capsule by mouth 4 times daily (before meals and nightly)    Olga Lidia Rodriguez MD   scopolamine (TRANSDERM-SCOP) transdermal patch Place 1 patch onto the skin every 72 hours New patch placed 3/18/2023    Olga Lidia Rodriguez MD   simethicone (MYLICON) 80 MG chewable tablet Take 125 mg by mouth every 6 hours as needed for Flatulence    Olga Lidia Rodriguez MD       Current 
No

## 2024-05-02 NOTE — OP NOTE
Operative Note      Patient: Chela Cui  YOB: 1990  MRN: [id number]    Date of Procedure: 5/2/2024    Pre-Op Diagnosis Codes:     * Secondary dysmenorrhea [N94.5]     * Excessive or frequent menstruation [N92.0]     * Abnormal glandular Papanicolaou smear of cervix [R87.619]  Stress Urinary Continence    Post-Op Diagnosis: Same       Procedure(s):  ROBOTIC ASSISTED TOTAL LAPAROSCOPIC HYSTERECTOMY, BILATERAL SALPINGECTOMY ERAS    Surgeon(s):  Isra Suarez MD    Assistant:   * No surgical staff found *    Anesthesia: General    Estimated Blood Loss (mL): less than 50     Complications: None    Specimens:   ID Type Source Tests Collected by Time Destination   A : UTERUS, CERVIX, RIGHT FALLOPIAN TUBE Tissue Uterus SURGICAL PATHOLOGY Isra Suarez MD 5/2/2024 1024        Implants:  * No implants in log *      Drains:   [REMOVED] Urinary Catheter 05/02/24 Oglesby (Removed)       Findings:  Infection Present At Time Of Surgery (PATOS) (choose all levels that have infection present):  No infection present  Other Findings: absent left tube, grossly normal right tube, grossly normal uterus, ovaries, bladder mucosa, patent ureteral orificies    Detailed Description of Procedure:     The patient was taken to the operating room after informed consent had been obtained. She was placed on the OR table and general anesthesia was initiated. She was then placed in the dorsal lithotomy position, prepped and draped in a sterile fashion. Oglesby catheter was inserted. Serial compression stocking were in place. Time out was completed. Attention was turned to the vagina where a weighted-speculum was placed. A Field retractor was used to expose the cervix and the cervix was grasped at the 12 o’clock position with a single tooth tenaculum. The Natasha uterine manipulator was affixed to the cervix and the other instruments were removed from the vagina. Attention was turned to the abdomen, where Marcaine was injected

## 2024-05-02 NOTE — ANESTHESIA POSTPROCEDURE EVALUATION
Department of Anesthesiology  Postprocedure Note    Patient: Chela Cui  MRN: [id number]  YOB: 1990  Date of evaluation: 5/2/2024    Procedure Summary       Date: 05/02/24 Room / Location: [facility] MAIN 03 / [facility] MAIN OR    Anesthesia Start: 0930 Anesthesia Stop: 1057    Procedure: ROBOTIC ASSISTED TOTAL LAPAROSCOPIC HYSTERECTOMY, BILATERAL SALPINGECTOMY ERAS (Abdomen) Diagnosis:       Secondary dysmenorrhea      Excessive or frequent menstruation      Abnormal glandular Papanicolaou smear of cervix      (Secondary dysmenorrhea [N94.5])      (Excessive or frequent menstruation [N92.0])      (Abnormal glandular Papanicolaou smear of cervix [R87.619])    Surgeons: Isra Suarez MD Responsible Provider: Jessy Guido MD    Anesthesia Type: general ASA Status: 2            Anesthesia Type: No value filed.    Yamile Phase I: Yamile Score: 10    Yamile Phase II: Yamile Score: 9    Anesthesia Post Evaluation    Patient location during evaluation: PACU  Patient participation: complete - patient participated  Level of consciousness: awake and alert  Pain score: 3  Airway patency: patent  Nausea & Vomiting: no nausea  Cardiovascular status: blood pressure returned to baseline  Respiratory status: acceptable and room air  Hydration status: euvolemic  Pain management: satisfactory to patient    No notable events documented.

## 2024-05-02 NOTE — DISCHARGE INSTRUCTIONS
you experience any of the following symptoms:  Weight gain of 3 pounds or more overnight or 5 pounds in a week, increased swelling in our hands or feet or shortness of breath while lying flat in bed.  Please call your doctor as soon as you notice any of these symptoms; do not wait until your next office visit.     Patient armband removed and shredded     The discharge information has been reviewed with the patient and caregiver.  The patient and caregiver verbalized understanding.  Discharge medications reviewed with the patient and caregiver and appropriate educational materials and side effects teaching were provided.           Nausea and Vomiting After Surgery: Care Instructions  Your Care Instructions     After you've had surgery, you may feel sick to your stomach (nauseated) or you may vomit. Sometimes anesthesia can make you feel sick. It's a common side effect and often doesn't last long. Pain also can make you feel sick or vomit. After the anesthesia wears off, you may feel pain from the incision (cut). That pain could then upset your stomach. Taking pain medicine can also make you feel sick to your stomach.  Whatever the cause, you may get medicine that can help. There are also some things you can do at home to prevent nausea and feel better.  The doctor has checked you carefully, but problems can develop later. If you notice any problems or new symptoms, get medical treatment right away.  Follow-up care is a key part of your treatment and safety. Be sure to make and go to all appointments, and call your doctor if you are having problems. It's also a good idea to know your test results and keep a list of the medicines you take.  How can you care for yourself at home?  Take your pain medicine as soon as you have pain. It works better if you take it before the pain gets bad.  Call your doctor if you have any problems with your medicine.  To prevent dehydration, drink plenty of fluids. Choose water and other  clear liquids until you feel better. If you have kidney, heart, or liver disease and have to limit fluids, talk with your doctor before you increase the amount of fluids you drink.  When you are able to eat, try clear soups, mild foods, and liquids until all symptoms are gone for 12 to 48 hours. Other good choices include dry toast, crackers, cooked cereal, and gelatin dessert, such as Jell-O.  Do not smoke. Smoking and being around smoke can make nausea worse.   When should you call for help?    Call your doctor now or seek immediate medical care if:    You have new or worse nausea or vomiting.     You are too sick to your stomach to drink any fluids.     You cannot keep down fluids.     You have symptoms of dehydration, such as:  Dry eyes and a dry mouth.  Passing only a little urine.  Feeling thirstier than usual.     You are dizzy or lightheaded, or you feel like you may faint.           Infection After Surgery: Care Instructions  Overview  After surgery, an infection is always possible. It doesn't mean that the surgery didn't go well.  How can you care for yourself at home?  Make sure your surgeon knows about the infection, especially if you saw another doctor about your symptoms.  If your doctor prescribed antibiotics, take them as directed. Do not stop taking them just because you feel better. You need to take the full course of antibiotics.  Keep the skin clean and dry.  You may have a bandage over the cut (incision). A bandage helps the incision heal and protects it. Your doctor will tell you how to take care of this. Keep it clean and dry. You may have drainage from the wound.     Call your doctor now or seek immediate medical care if:    You have signs of an infection such as:  Increased pain, swelling, warmth, or redness in the area.  Red streaks leading from the area.  Pus draining from the wound.  A new or higher fever.       Bleeding After Surgery: Care Instructions  Overview  After surgery, it is

## 2024-05-02 NOTE — PERIOP NOTE
Yes No N/A Patient confirms that: Comments:    [x]   []     []    Bathed night prior with antibacterial soap (ex: Dial)     [x]   []   [] Time of last light snack was > 8 hours prior to arrival     [x]   []     []    Time of last clear liquid was > 2 hours prior to arrival     []   [x]   [] Drank 12 ounces of a Carbohydrate Loading drink 2 hours prior to arrival time Patient drank 8 ounces of water >2 hrs ago    [x]   []     []    Did not consume any milk, milk products or solid food on day of surgery     []   [x]   [] Did not drink anything else after the Pre-Surgery 12 ounces of Gatorade Patient drank 8 ounces of water >2 hrs ago         Dr. Guido notified. Ok to proceed. No new order received.

## (undated) DEVICE — SET TBNG DISP TIP FOR AHTO

## (undated) DEVICE — TISSUE RETRIEVAL SYSTEM: Brand: INZII RETRIEVAL SYSTEM

## (undated) DEVICE — DRAPE C ARM UNIV W41XL74IN CLR PLAS XR VELC CLSR POLY STRP

## (undated) DEVICE — APPLIER CLP M L L11.4IN DIA10MM ENDOSCP ROT MULT FOR LIG

## (undated) DEVICE — GARMENT,MEDLINE,DVT,INT,CALF,MED, GEN2: Brand: MEDLINE

## (undated) DEVICE — SOLUTION IRRIG 500ML 0.9% SOD CHLO USP POUR PLAS BTL

## (undated) DEVICE — SYRINGE MED 10ML LUERLOCK TIP W/O SFTY DISP

## (undated) DEVICE — ARM DRAPE

## (undated) DEVICE — SOLUTION IRRIGATION H2O 0797305] ICU MEDICAL INC]

## (undated) DEVICE — AIRSEAL 5 MM ACCESS PORT AND LOW PROFILE OBTURATOR WITH BLADELESS OPTICAL TIP, 120 MM LENGTH: Brand: AIRSEAL

## (undated) DEVICE — TROCAR: Brand: KII® OPTICAL ACCESS SYSTEM

## (undated) DEVICE — GLOVE SURG SZ 9 THK91MIL LTX FREE SYN POLYISOPRENE ANTI

## (undated) DEVICE — TIP IU L8CM DIA6.7MM BLU SIL FLX DISP RUMI II

## (undated) DEVICE — ROBOTIC PACK: Brand: MEDLINE INDUSTRIES, INC.

## (undated) DEVICE — TRI-LUMEN FILTERED TUBE SET WITH ACTIVATED CHARCOAL FILTER: Brand: AIRSEAL

## (undated) DEVICE — Y-TYPE TUR/BLADDER IRRIGATION SET, REGULATING CLAMP

## (undated) DEVICE — LAP CHOLE: Brand: MEDLINE INDUSTRIES, INC.

## (undated) DEVICE — LAPAROSCOPIC TROCAR SLEEVE/SINGLE USE: Brand: KII® OPTICAL ACCESS SYSTEM

## (undated) DEVICE — BLADELESS OBTURATOR: Brand: WECK VISTA

## (undated) DEVICE — SYSTEM ES CUP DIA3.5CM PNEUMO OCCL BLLN DISP FOR CLIN POS

## (undated) DEVICE — SOLUTION LACTATED RINGERS INJECTION USP

## (undated) DEVICE — [HIGH FLOW INSUFFLATOR,  DO NOT USE IF PACKAGE IS DAMAGED,  KEEP DRY,  KEEP AWAY FROM SUNLIGHT,  PROTECT FROM HEAT AND RADIOACTIVE SOURCES.]: Brand: PNEUMOSURE

## (undated) DEVICE — TIP COVER ACCESSORY

## (undated) DEVICE — SOLUTION IV LACTATED RINGERS INJECTION USP

## (undated) DEVICE — SUTURE MCRYL + SZ 4-0 L27IN ABSRB UD L19MM PS-2 3/8 CIR MCP426H

## (undated) DEVICE — ELECTRODE PT RET AD L9FT HI MOIST COND ADH HYDRGEL CORDED

## (undated) DEVICE — INSUFFLATION NEEDLE TO ESTABLISH PNEUMOPERITONEUM.: Brand: INSUFFLATION NEEDLE

## (undated) DEVICE — PAD PT POS 36 IN SURGYPAD DISP

## (undated) DEVICE — GLOVE ORANGE PI 8 1/2   MSG9085

## (undated) DEVICE — SEAL

## (undated) DEVICE — SCISSORS ENDOSCP DIA5MM CRV MPLR CAUT W/ RATCH HNDL

## (undated) DEVICE — PAD BD CONVOLUTED FOAM

## (undated) DEVICE — SUTURE DEV SZ 2-0 WND CLSR ABSRB GS-22 VLOC COVIDIEN VLOCM2145

## (undated) DEVICE — SOLUTION ANTIFOG VIS SYS CLEARIFY LAPSCP

## (undated) DEVICE — LIQUIBAND RAPID ADHESIVE 36/CS 0.8ML: Brand: MEDLINE

## (undated) DEVICE — SUTURE PDS II SZ 0 L27IN ABSRB VLT L26MM CT-2 1/2 CIR Z334H

## (undated) DEVICE — 1LYRTR 16FR10ML100%SIL UMS SNP: Brand: MEDLINE INDUSTRIES, INC.

## (undated) DEVICE — COLUMN DRAPE

## (undated) DEVICE — GLOVE SURG SZ 8 L12IN FNGR THK79MIL GRN LTX FREE

## (undated) DEVICE — ADHESIVE SKIN CLSR 0.7ML TOP DERMBND ADV

## (undated) DEVICE — E-Z CLEAN, PTFE COATED, ELECTROSURGICAL LAPAROSCOPIC ELECTRODE, J-HOOK, 33 CM., SINGLE-USE, FOR USE WITH HAND CONTROL PENCIL: Brand: MEGADYNE

## (undated) DEVICE — TROCAR: Brand: KII® SLEEVE

## (undated) DEVICE — GLOVE ORANGE PI 7 1/2   MSG9075